# Patient Record
Sex: MALE | Race: WHITE | Employment: FULL TIME | URBAN - METROPOLITAN AREA
[De-identification: names, ages, dates, MRNs, and addresses within clinical notes are randomized per-mention and may not be internally consistent; named-entity substitution may affect disease eponyms.]

---

## 2017-01-06 ENCOUNTER — THERAPY VISIT (OUTPATIENT)
Dept: PHYSICAL THERAPY | Facility: CLINIC | Age: 47
End: 2017-01-06
Payer: COMMERCIAL

## 2017-01-06 DIAGNOSIS — M25.551 HIP PAIN, RIGHT: ICD-10-CM

## 2017-01-06 DIAGNOSIS — M25.552 HIP PAIN, LEFT: Primary | ICD-10-CM

## 2017-01-06 PROCEDURE — 97110 THERAPEUTIC EXERCISES: CPT | Mod: GP | Performed by: PHYSICAL THERAPIST

## 2017-01-06 PROCEDURE — 97112 NEUROMUSCULAR REEDUCATION: CPT | Mod: GP | Performed by: PHYSICAL THERAPIST

## 2017-01-25 ENCOUNTER — THERAPY VISIT (OUTPATIENT)
Dept: PHYSICAL THERAPY | Facility: CLINIC | Age: 47
End: 2017-01-25
Payer: COMMERCIAL

## 2017-01-25 DIAGNOSIS — M25.552 HIP PAIN, LEFT: Primary | ICD-10-CM

## 2017-01-25 DIAGNOSIS — M25.551 HIP PAIN, RIGHT: ICD-10-CM

## 2017-01-25 PROCEDURE — 97140 MANUAL THERAPY 1/> REGIONS: CPT | Mod: GP | Performed by: PHYSICAL THERAPIST

## 2017-01-25 PROCEDURE — 97110 THERAPEUTIC EXERCISES: CPT | Mod: GP | Performed by: PHYSICAL THERAPIST

## 2017-02-23 ENCOUNTER — THERAPY VISIT (OUTPATIENT)
Dept: PHYSICAL THERAPY | Facility: CLINIC | Age: 47
End: 2017-02-23
Payer: COMMERCIAL

## 2017-02-23 DIAGNOSIS — M25.552 HIP PAIN, LEFT: ICD-10-CM

## 2017-02-23 DIAGNOSIS — M25.551 HIP PAIN, RIGHT: ICD-10-CM

## 2017-02-23 PROCEDURE — 97110 THERAPEUTIC EXERCISES: CPT | Mod: GP | Performed by: PHYSICAL THERAPIST

## 2017-02-23 NOTE — LETTER
[facility]  [address]  [place] [address]  [phone]    2017    Re: Cleveland Maciel   :   1970  MRN:  [id number]   REFERRING PHYSICIAN:   [facility]    Date of Initial Evaluation:  16  Visits:  Rxs Used: 10  Reason for Referral:     Hip pain, left  Hip pain, right    PROGRESS  REPORT  Progress reporting period is from 2016 to 2017.     SUBJECTIVE  Subjective: Cleveland has low grade intermittant adductor soreness yet that is quite improved over time and exercise emphasizing hip, glute, core strength, but still lingering and reproducable with DALTON and FADIR end range stress. Roughly 6 1/2 months of  self care over 10 PT visits. Patient also self initiated [facility]   Current Pain level: 1/10   Initial Pain level: 7/10   Changes in function: No changes noted in function since last SOAP note   Adverse reactions: None    OBJECTIVE  Objective: Adductor palpation pain that may respond to change of exercise and deep tissue massage.   Will elect FADIR stretch for the next 10-14 days, if no change , consider deep tissue work.    Patient continues to slowly be on the mend and increased activity tolerance. He may be candidate to follow with MD once again to address possible internal labral derangement vs RINA impingement   Artem to ascend and descend stairs, walk and stand greater than 1/2 hour, and partial squat, all which he was unable to perform 6 months ago.    ASSESSMENT/PLAN  Updated problem list and treatment plan: Diagnosis 1:  Bilateral hip/groin  pain     STG/LTGs have been met or progress has been made towards goals:  Yes (See Goal flow sheet completed today.)  Assessment of Progress: The patient's condition is improving.  The patient's progress has slowed.  Self Management Plans:  Patient has been instructed in a home treatment program.  I have re-evaluated this patient and find that the nature,  scope, duration and intensity of the therapy is appropriate for the medical condition of the patient.  The patient is returning to your office for a recheck appointment.    Recommendations:  This patient would benefit from continued therapy.     Frequency:  1-2 X a month, once daily  Duration:  for 2 months    Thank you for your referral.    INQUIRIES  Therapist: [facility]  [address] [place] [address]  Phone: [phone]  Fax: [phone]

## 2017-02-26 NOTE — PROGRESS NOTES
Subjective:    HPI                    Objective:    System    Physical Exam    General     ROS    Assessment/Plan:      PROGRESS  REPORT    Progress reporting period is from June 29, 2016 to February 23, 2017.     SUBJECTIVE  Subjective: Cleveland has low grade intermittant adductor soreness yet that is quite improved over time and exercise emphasizing hip, glute, core strength, but still lingering and reproducable with DALTON and FADIR end range stress. Roughly 6 1/2 months of  self care over 10 PT visits. Patient also self initiated [facility]   Current Pain level: 1/10   Initial Pain level: 7/10   Changes in function: No changes noted in function since last SOAP note   Adverse reactions: None;   ,         OBJECTIVE  Objective: Adductor palpation pain that may respond to change of exercise and deep tissue massage.     Will elect FADIR stretch for the next 10-14 days, if no change , consider deep tissue work.       Patient continues to slowly be on the mend and increased activity tolerance. He may be candidate to follow with MD once again to address possible internal labral derangement vs RINA impingement     Artem to ascend and descend stairs, walk and stand greater than 1/2 hour, and partial squat, all which he was unable to perform 6 months ago.    ASSESSMENT/PLAN  Updated problem list and treatment plan: Diagnosis 1:  Bilateral hip/groin  pain     STG/LTGs have been met or progress has been made towards goals:  Yes (See Goal flow sheet completed today.)  Assessment of Progress: The patient's condition is improving.  The patient's progress has slowed.  Self Management Plans:  Patient has been instructed in a home treatment program.  I have re-evaluated this patient and find that the nature, scope, duration and intensity of the therapy is appropriate for the medical condition of the patient.    The patient is returning to your office for a recheck appointment.    Recommendations:  This patient would benefit  from continued therapy.     Frequency:  1-2 X a month, once daily  Duration:  for 2 months        Please refer to the daily flowsheet for treatment today, total treatment time and time spent performing 1:1 timed codes.

## 2017-03-22 ENCOUNTER — THERAPY VISIT (OUTPATIENT)
Dept: PHYSICAL THERAPY | Facility: CLINIC | Age: 47
End: 2017-03-22
Payer: COMMERCIAL

## 2017-03-22 DIAGNOSIS — M25.552 HIP PAIN, LEFT: ICD-10-CM

## 2017-03-22 DIAGNOSIS — M25.551 HIP PAIN, RIGHT: ICD-10-CM

## 2017-03-22 PROCEDURE — 97110 THERAPEUTIC EXERCISES: CPT | Mod: GP | Performed by: PHYSICAL THERAPIST

## 2017-03-22 PROCEDURE — 97035 APP MDLTY 1+ULTRASOUND EA 15: CPT | Mod: GP | Performed by: PHYSICAL THERAPIST

## 2017-03-22 PROCEDURE — 97140 MANUAL THERAPY 1/> REGIONS: CPT | Mod: GP | Performed by: PHYSICAL THERAPIST

## 2017-03-22 NOTE — LETTER
[facility]  [address]  [place] [address]  [phone]    2017    Re: Cleveland Maciel   :   1970  MRN:  [id number]   REFERRING PHYSICIAN:   [facility]    Date of Initial Evaluation:  17  Visits:  Rxs Used: 11  Reason for Referral:     Hip pain, left  Hip pain, right    PROGRESS  REPORT  Progress reporting period is from 2017  to 2017 .     SUBJECTIVE  Subjective: Cleveland has been stretching and strengthening  L hip and core for the past 6 months. On balance, he is improved function and sx's, but certainly symptomatic yet with hip adductor and impingement pain with FADIR movement. Cleveland is fully activie with day to day activity, including ice skating and low impact sports.    Current Pain level: 0/10   Initial Pain level: 7/10   Changes in function: No changes noted in function since last SOAP note   Adverse reactions: None    OBJECTIVE  Objective: Elected to manage deep soft tissue massge to L hip adductor longus insertion. Continue core and hip strength. ADvance with IR/ER hip stretching, hip flexor stretch as well. Consider DC PT if no change of sx's and recommend MD consult for internal deragement within the next 2-3 weeks if failing to improve further.       ASSESSMENT/PLAN  Updated problem list and treatment plan: Diagnosis 1:  L hip /groin pain     STG/LTGs have been met or progress has been made towards goals:  Yes (See Goal flow sheet completed today.)  Assessment of Progress: The patient's progress has plateaued.  The patient's condition is unchanged.  Self Management Plans:  Patient has been instructed in a home treatment program.  I have re-evaluated this patient and find that the nature, scope, duration and intensity of the therapy is appropriate for the medical condition of the patient.  The patient is returning to your office for a recheck appointment.  The patient failed to complete scheduled/ordered  appointments so current information is unknown.  We will discharge this patient from PT.    Recommendations:  This patient would benefit from further evaluation.    Thank you for your referral.    INQUIRIES  Therapist: aRsta Alejandre PT  [facility]  [address] [place] [address]  Phone: [phone]  Fax: [phone]

## 2017-03-22 NOTE — PROGRESS NOTES
Subjective:    HPI                    Objective:    System    Physical Exam    General     ROS    Assessment/Plan:      PROGRESS  REPORT    Progress reporting period is from Feb 23, 2017  to March 23, 2017 .     SUBJECTIVE  Subjective: Cleveland has been stretching and strengthening  L hip and core for the past 6 months. On balance, he is improved function and sx's, but certainly symptomatic yet with hip adductor and impingement pain with FADIR movement. Cleveland is fully activie with day to day activity, including ice skating and low impact sports.        Current Pain level: 0/10   Initial Pain level: 7/10   Changes in function: No changes noted in function since last SOAP note   Adverse reactions: None;   ,         OBJECTIVE  Objective: Elected to manage deep soft tissue massge to L hip adductor longus insertion. Continue core and hip strength. ADvance with IR/ER hip stretching, hip flexor stretch as well. Consider DC PT if no change of sx's and recommend MD consult for internal deragement within the next 2-3 weeks if failing to improve further.       ASSESSMENT/PLAN  Updated problem list and treatment plan: Diagnosis 1:  L hip /groin pain     STG/LTGs have been met or progress has been made towards goals:  Yes (See Goal flow sheet completed today.)  Assessment of Progress: The patient's progress has plateaued.  The patient's condition is unchanged.  Self Management Plans:  Patient has been instructed in a home treatment program.  I have re-evaluated this patient and find that the nature, scope, duration and intensity of the therapy is appropriate for the medical condition of the patient.    The patient is returning to your office for a recheck appointment.  The patient failed to complete scheduled/ordered appointments so current information is unknown.  We will discharge this patient from PT.    Recommendations:  This patient would benefit from further evaluation.    Please refer to the daily flowsheet for treatment  today, total treatment time and time spent performing 1:1 timed codes.

## 2017-04-26 ENCOUNTER — THERAPY VISIT (OUTPATIENT)
Dept: PHYSICAL THERAPY | Facility: CLINIC | Age: 47
End: 2017-04-26
Payer: COMMERCIAL

## 2017-04-26 DIAGNOSIS — M25.552 HIP PAIN, LEFT: ICD-10-CM

## 2017-04-26 DIAGNOSIS — M25.551 HIP PAIN, RIGHT: ICD-10-CM

## 2017-04-26 PROCEDURE — 97110 THERAPEUTIC EXERCISES: CPT | Mod: GP | Performed by: PHYSICAL THERAPIST

## 2017-04-26 NOTE — LETTER
[facility]  [address]  [place] [id number] [phone]    May 16, 2017    Re: Cleveland Maciel   :   1970  MRN:  [id number]   REFERRING PHYSICIAN:   [facility]    Date of Initial Evaluation:  16  Visits:  Rxs Used: 12  Reason for Referral:     Hip pain, left  Hip pain, right    EVALUATION SUMMARY     DISCHARGE REPORT  Progress reporting period is from 2017 to 2017.     SUBJECTIVE  Subjective: Cleveland has been seen in PT for a total of 12 visits over the past 10 months. Overall he is improved in many ways of pain, ROM and strength. However, he has plateued in progression over the last 12 weeks. Patient has engaged to progressive strengthening, for core, hips, gliutes, hamstring/quad as well as global stretch modification and manual mobilization. Modalities also exhausted.    Current Pain level: 3/10   Initial Pain level: 7/10   Changes in function: No changes noted in function since last SOAP note   Adverse reactions: None;   ,       OBJECTIVE  Objective: Positive RINA sx's and testing. Recommend to follow with his hip specialist MD. DC PT  is plan at this point until further consult with ortho.       ASSESSMENT/PLAN  Updated problem list and treatment plan: Diagnosis 1:  Bilateral hip and groin pain, chronic      Assessment of Progress: The patient's condition is unchanged.  Self Management Plans:  Patient has been instructed in a home treatment program.    The patient is returning to your office for a recheck appointment.    Recommendations:  This patient is ready to be discharged from therapy and continue their home treatment program.    Thank you for your referral.    INQUIRIES  Therapist: Rasta Alejandre PT, ATC   [facility]  [address]  [place] [address]  Phone: [phone]  Fax: [phone]

## 2017-05-16 NOTE — PROGRESS NOTES
Subjective:    HPI                    Objective:    System    Physical Exam    General     ROS    Assessment/Plan:      DISCHARGE REPORT    Progress reporting period is from March 23, 2017 to April 26, 2017.     SUBJECTIVE  Subjective: Cleveland has been seen in PT for a total of 12 visits over the past 10 months. Overall he is improved in many ways of pain, ROM and strength. However, he has plateued in progression over the last 12 weeks. Patient has engaged to progressive strengthening, for core, hips, gliutes, hamstring/quad as well as global stretch modification and manual mobilization. Modalities also exhausted.    Current Pain level: 3/10   Initial Pain level: 7/10   Changes in function: No changes noted in function since last SOAP note   Adverse reactions: None;   ,         OBJECTIVE  Objective: Positive RINA sx's and testing. Recommend to follow with his hip specialist MD. DC PT  is plan at this point until further consult with ortho.       ASSESSMENT/PLAN  Updated problem list and treatment plan: Diagnosis 1:  Bilateral hip and groin pain, chronic      Assessment of Progress: The patient's condition is unchanged.  Self Management Plans:  Patient has been instructed in a home treatment program.      The patient is returning to your office for a recheck appointment.    Recommendations:  This patient is ready to be discharged from therapy and continue their home treatment program.    Please refer to the daily flowsheet for treatment today, total treatment time and time spent performing 1:1 timed codes.

## 2019-03-01 ENCOUNTER — OFFICE VISIT (OUTPATIENT)
Dept: URGENT CARE | Facility: URGENT CARE | Age: 49
End: 2019-03-01
Payer: COMMERCIAL

## 2019-03-01 ENCOUNTER — ANCILLARY PROCEDURE (OUTPATIENT)
Dept: GENERAL RADIOLOGY | Facility: CLINIC | Age: 49
End: 2019-03-01
Attending: NURSE PRACTITIONER
Payer: COMMERCIAL

## 2019-03-01 VITALS
HEART RATE: 58 BPM | TEMPERATURE: 98.5 F | OXYGEN SATURATION: 98 % | SYSTOLIC BLOOD PRESSURE: 177 MMHG | WEIGHT: 192 LBS | DIASTOLIC BLOOD PRESSURE: 90 MMHG

## 2019-03-01 DIAGNOSIS — R07.81 RIB TENDERNESS: Primary | ICD-10-CM

## 2019-03-01 DIAGNOSIS — R07.81 RIB TENDERNESS: ICD-10-CM

## 2019-03-01 PROCEDURE — 96372 THER/PROPH/DIAG INJ SC/IM: CPT | Performed by: NURSE PRACTITIONER

## 2019-03-01 PROCEDURE — 99213 OFFICE O/P EST LOW 20 MIN: CPT | Performed by: NURSE PRACTITIONER

## 2019-03-01 PROCEDURE — 71101 X-RAY EXAM UNILAT RIBS/CHEST: CPT | Mod: RT

## 2019-03-01 RX ORDER — HYDROCODONE BITARTRATE AND ACETAMINOPHEN 5; 325 MG/1; MG/1
1 TABLET ORAL EVERY 6 HOURS PRN
Qty: 10 TABLET | Refills: 0 | Status: SHIPPED | OUTPATIENT
Start: 2019-03-01 | End: 2019-03-04

## 2019-03-01 RX ORDER — DICLOFENAC SODIUM 75 MG/1
TABLET, DELAYED RELEASE ORAL
Refills: 1 | COMMUNITY
Start: 2019-01-07 | End: 2021-08-15

## 2019-03-01 RX ORDER — KETOROLAC TROMETHAMINE 30 MG/ML
60 INJECTION, SOLUTION INTRAMUSCULAR; INTRAVENOUS ONCE
Status: COMPLETED | OUTPATIENT
Start: 2019-03-01 | End: 2019-03-01

## 2019-03-01 RX ORDER — DEXTROAMPHETAMINE SACCHARATE, AMPHETAMINE ASPARTATE, DEXTROAMPHETAMINE SULFATE AND AMPHETAMINE SULFATE 7.5; 7.5; 7.5; 7.5 MG/1; MG/1; MG/1; MG/1
30 TABLET ORAL EVERY 24 HOURS
COMMUNITY
Start: 2014-12-28 | End: 2021-08-15

## 2019-03-01 RX ADMIN — KETOROLAC TROMETHAMINE 60 MG: 30 INJECTION, SOLUTION INTRAMUSCULAR; INTRAVENOUS at 21:12

## 2019-03-01 ASSESSMENT — ENCOUNTER SYMPTOMS
CONSTITUTIONAL NEGATIVE: 1
NEUROLOGICAL NEGATIVE: 1
COUGH: 0
HEMOPTYSIS: 0
SHORTNESS OF BREATH: 0

## 2019-03-01 ASSESSMENT — PAIN SCALES - GENERAL: PAINLEVEL: EXTREME PAIN (8)

## 2019-03-02 NOTE — PATIENT INSTRUCTIONS
Patient Education     Rib Contusion or Minor Fracture    A rib contusion is a bruise to one or more rib bones. It may cause pain, tenderness, swelling, and a purplish color to the skin. There may be a sharp pain with each breath. A rib contusion takes anywhere from a few days to a few weeks to heal. A minor rib fracture or break may cause the same symptoms as a rib contusion. The small crack may not be seen on a regular chest X-ray. Treatment for both problems is basically the same.  Home care    You may use over-the-counter pain medicine to control pain, unless another pain medicine was prescribed. If you have chronic liver or kidney disease or ever had a stomach ulcer or GI (gastrointestinal) bleeding, talk with your healthcare provider before using these medicines.    Rest. Don't lift anything heavy or do any activity that causes pain.    Apply an ice pack over the injured area for 15 to 20 minutes every 1 to 2 hours. You should do this for the first 24 to 48 hours. To make an ice pack, put ice cubes in a plastic bag that seals at the top. Wrap the bag in a clean, thin towel or cloth. Never put ice or an ice pack directly on the skin. Continue with ice packs as needed for the relief of pain and swelling.    The first 3 to 4 weeks of healing will be the most painful. If your pain is not under control with the treatment given, call your healthcare provider. Sometimes a stronger pain medicine may be needed. A nerve block can be done in case of severe pain. It will numb the nerve between the ribs.  Follow-up care  Follow up with your healthcare provider, or as advised.  If X-rays were taken, you will be told of any new findings that may affect your care.  Call 911  Call 911 if you have:    Dizziness, weakness or fainting    Shortness of breath with or without chest discomfort    New or worsening pain  When to seek medical advice  Call your healthcare provider right away if any of these occur:    Fever of 100.4 F  (38 C) or higher, or as directed by your healthcare provider    Chills    Stomach pain, vomiting  Date Last Reviewed: 6/1/2018 2000-2018 [email or web address]. [address] [place] [address], [place] [address]. All rights reserved. This information is not intended as a substitute for professional medical care. Always follow your healthcare professional's instructions.

## 2019-03-02 NOTE — PROGRESS NOTES
"HPI  Cleveland Maciel 48 year old presents to  with chief complaint of severe anterior rib pain. He reports he was getting a chiropractic adjustment today when he was \"snapped\" and had immediate severe pain to the right side of his anterior chest. The pain has worsened over the day and now it is painful to move, breath or lie down. He took 2 ibuprofen approximately 5 hours ago. He also tried icing without much relief. He rates his pain an 8-9/10.       Past Medical History:   Diagnosis Date     ADD (attention deficit disorder)      No past surgical history on file.  Patient Active Problem List   Diagnosis   (none) - all problems resolved or deleted     No Known Allergies  Current Outpatient Medications   Medication     amphetamine-dextroamphetamine (ADDERALL) 30 MG tablet     HYDROcodone-acetaminophen (NORCO) 5-325 MG tablet     diclofenac (VOLTAREN) 75 MG EC tablet     No current facility-administered medications for this visit.           Review of Systems   Constitutional: Negative.    Respiratory: Negative for cough, hemoptysis and shortness of breath.    Cardiovascular: Positive for chest pain.   Musculoskeletal:        Rib pain right anterior   Skin: Negative.    Neurological: Negative.    Endo/Heme/Allergies: Negative.          Physical Exam   Constitutional: He appears well-developed and well-nourished. No distress.   /90   Pulse 58   Temp 98.5  F (36.9  C) (Oral)   Wt 87.1 kg (192 lb)   SpO2 98% .    HENT:   Head: Normocephalic.   Cardiovascular: Normal rate.   Pulmonary/Chest: Effort normal and breath sounds normal.   Musculoskeletal:        Arms:  Skin: Skin is warm and dry. No ecchymosis noted.   Nursing note and vitals reviewed.    XRAY: Full radiologist report pending, no obvious dislocated fracture,     Assessment:  1. Rib tenderness        Plan:  Orders Placed This Encounter     XR Ribs & Chest Right G/E 3 Views     amphetamine-dextroamphetamine (ADDERALL) 30 MG tablet     diclofenac " (VOLTAREN) 75 MG EC tablet     ketorolac (TORADOL) injection 60 mg     HYDROcodone-acetaminophen (NORCO) 5-325 MG tablet   Icing  Splint with cough/sneeze  Deep breath every hour to prevent atelectasis  Instructions regarding self-care of patient/child reviewed.   Written instructions provided in after visit summary and reviewed.  Patient instructed to see primary care provider for new or persistent symptoms.   Red flag symptoms reviewed and patient has been instructed to seek emergent   Care at the closest emergency room for evaluation and treatment.   Please contact pharmacy for medication questions.  Patient instructed to take medications as directed on package.      Kaia Jarquin, APRN, CNP

## 2021-08-15 ENCOUNTER — OFFICE VISIT (OUTPATIENT)
Dept: URGENT CARE | Facility: URGENT CARE | Age: 51
End: 2021-08-15
Payer: COMMERCIAL

## 2021-08-15 VITALS
SYSTOLIC BLOOD PRESSURE: 151 MMHG | OXYGEN SATURATION: 99 % | HEART RATE: 71 BPM | TEMPERATURE: 98 F | DIASTOLIC BLOOD PRESSURE: 81 MMHG

## 2021-08-15 DIAGNOSIS — J01.90 SUBACUTE SINUSITIS, UNSPECIFIED LOCATION: Primary | ICD-10-CM

## 2021-08-15 PROCEDURE — 99213 OFFICE O/P EST LOW 20 MIN: CPT | Performed by: FAMILY MEDICINE

## 2021-08-15 NOTE — PROGRESS NOTES
Assessment & Plan     Subacute sinusitis, unspecified location  Differentials discussed in detail including subacute sinusitis versus allergies.  Augmentin prescribed considering chronicity of symptoms and suggested to use Flonase, over-the-counter antihistamine.  Continue well hydration, warm fluids.  Recommended to monitor blood pressure at home regularly and to follow-up with PCP as well.  Patient/spouse understood and in agreement with above plan.  All questions answered.  - amoxicillin-clavulanate (AUGMENTIN) 875-125 MG tablet; Take 1 tablet by mouth 2 times daily for 10 days      Marcus Aguirre MD  [facility]    Caron Guthrie is a 51 year old who presents for the following health issues     HPI     Concern -   Chief Complaint   Patient presents with     Sinus Problem     sinus congestion 5+ weeks     Onset: 5 weeks   Description: nasal congestion, post nasal drainage and some cough  Intensity: moderate  Progression of Symptoms:  same  Accompanying Signs & Symptoms: no fever, chills, chest pain, sob  Previous history of similar problem: no    Therapies tried and outcome: tylenol sinus        Review of Systems   Constitutional, HEENT, cardiovascular, pulmonary, gi and gu systems are negative, except as otherwise noted.      Objective    BP (!) 151/81   Pulse 71   Temp 98  F (36.7  C) (Tympanic)   SpO2 99%   There is no height or weight on file to calculate BMI.  Physical Exam   GENERAL: alert and no distress  EYES: Eyes grossly normal to inspection, PERRL and conjunctivae and sclerae normal  HENT: normal cephalic/atraumatic, ear canals and TM's normal, nose and mouth without ulcers or lesions, nasal mucosa edematous , oral mucous membranes moist and sinuses: not tender  NECK: no adenopathy, no asymmetry, masses, or scars and thyroid normal to palpation  RESP: lungs clear to auscultation - no rales, rhonchi or wheezes  CV: regular rates and rhythm, normal S1 S2, no S3 or S4  and no murmur, click or rub  ABDOMEN: soft, nontender  MS: no gross musculoskeletal defects noted, no edema

## 2021-08-29 ENCOUNTER — OFFICE VISIT (OUTPATIENT)
Dept: URGENT CARE | Facility: URGENT CARE | Age: 51
End: 2021-08-29
Payer: COMMERCIAL

## 2021-08-29 VITALS
TEMPERATURE: 98.7 F | DIASTOLIC BLOOD PRESSURE: 88 MMHG | SYSTOLIC BLOOD PRESSURE: 144 MMHG | HEART RATE: 79 BPM | OXYGEN SATURATION: 98 % | WEIGHT: 200 LBS

## 2021-08-29 DIAGNOSIS — J32.1 CHRONIC FRONTAL SINUSITIS: ICD-10-CM

## 2021-08-29 DIAGNOSIS — R03.0 ELEVATED BLOOD PRESSURE READING WITHOUT DIAGNOSIS OF HYPERTENSION: ICD-10-CM

## 2021-08-29 DIAGNOSIS — R51.9 ACUTE NONINTRACTABLE HEADACHE, UNSPECIFIED HEADACHE TYPE: Primary | ICD-10-CM

## 2021-08-29 PROCEDURE — 86618 LYME DISEASE ANTIBODY: CPT | Performed by: NURSE PRACTITIONER

## 2021-08-29 PROCEDURE — 99214 OFFICE O/P EST MOD 30 MIN: CPT | Performed by: NURSE PRACTITIONER

## 2021-08-29 PROCEDURE — 36415 COLL VENOUS BLD VENIPUNCTURE: CPT | Performed by: NURSE PRACTITIONER

## 2021-08-29 RX ORDER — DOXYCYCLINE HYCLATE 100 MG
100 TABLET ORAL 2 TIMES DAILY
Qty: 20 TABLET | Refills: 0 | Status: SHIPPED | OUTPATIENT
Start: 2021-08-29 | End: 2021-09-08

## 2021-08-29 RX ORDER — FLUTICASONE PROPIONATE 50 MCG
1 SPRAY, SUSPENSION (ML) NASAL DAILY
COMMUNITY
End: 2021-11-15

## 2021-08-29 RX ORDER — LORATADINE 10 MG/1
10 TABLET ORAL DAILY
COMMUNITY
End: 2022-02-15

## 2021-08-29 NOTE — LETTER
August 31, 2021      [place]  [address] [facility] [address]        Dear ,    We are writing to inform you of your test results.    Lyme's negative   SEBASTIÁN Munoz CNP       Resulted Orders   Lyme Disease Amparo with reflex to WB Serum   Result Value Ref Range    Lyme Disease Antibodies Total 0.15 <0.90      Comment:      Negative, Absence of detectable Borrelia burdorferi antibodies. A negative result does not exclude the possibility of Borrelia burgdorferi infection. If early Lyme disease is suspected, a second sample should be collected and tested 2 to 4 weeks later.

## 2021-08-29 NOTE — PROGRESS NOTES
Assessment & Plan     Acute nonintractable headache, unspecified headache type    - Lyme Disease Amparo with reflex to WB Serum  - doxycycline hyclate (VIBRA-TABS) 100 MG tablet  Dispense: 20 tablet; Refill: 0  - Lyme Disease Amparo with reflex to WB Serum    Chronic frontal sinusitis    - Otolaryngology Referral    Elevated blood pressure reading without diagnosis of hypertension       Patient declines COVID testing and chest xray at this time. Prescription sent to pharmacy for doxycycline twice daily for 10 days for possibility of chronic sinusitis causing symptoms. Patient requests ENT referral which is provided. Lyme testing in process, will notify if positive and extend doxycyline to 100 mg twice daily for 14 days. Rest, fluids, tylenol as needed. Continue flonase and Claritin. Add neti pot twice daily and decrease dairy which can worsen nasal congestion.     Patient is hypertensive today but asymptomatic.  No blurring of vision, chest pain, shortness of breath, dizziness, numbness, or weakness. Second BP reading was also above goal.  Patient instructed to follow up with PCP within the next week for BP recheck.  Instructed to go to emergency department if develops chest pain, shortness of breath, dizziness, vision changes, numbness, weakness.     Follow-up with PCP if symptoms persist for 7 days, and sooner if symptoms worsen or new symptoms develop.     Discussed red flag symptoms which warrant immediate visit in emergency room    All questions were answered and patient verbalized understanding. AVS reviewed with patient.     Rosa Oconnor, DNP, APRN, CNP 8/29/2021 4:48 PM  [facility] [place]            Caron Guthrie is a 51 year old male who presents to clinic today with significant other for the following health issues:  Chief Complaint   Patient presents with     Headache     Sx started around July 10th. Has been seen for this a few weeks ago and did Abx, claritin and flonase. Some  help but Sx are back. pressure over right eye.     Patient presents for evaluation of sinus pressure/headache. Symptoms started about 7 weeks ago, 7/10. He was evaluated in urgent care 8/15 when he was treated with Augmentin for sinusitis which helped temporarily along with flonase and Claritin, but now symptoms have returned. Associated symptoms: fatigue, nasal congestion, post-nasal drip, cough. Nasal congestion still present, but has improved and is now worsening. He has been having a dry cough which is worse in the morning and gets better as symptoms improve. He took tylenol today and isn't sure if that has helped. He has a history of sinus infection for which he had a CT and it was behind his right eye. He has not been vaccinated for COVID and declines testing. Denies vision changes, joint pain, though he has some neck discomfort. Denies ear pain, teeth pain, sore throat, bullseye rash. Headache pain is moderate. Cough has been intermittent and not consistent. He does chew tobacco.  No known tick bite.     Problem list, Medication list, Allergies, and Medical history reviewed in EPIC.    ROS:  Review of systems negative except for noted above        Objective    BP (!) 144/88   Pulse 79   Temp 98.7  F (37.1  C) (Tympanic)   Wt 90.7 kg (200 lb)   SpO2 98%   Physical Exam  Constitutional:       General: He is not in acute distress.     Appearance: He is not toxic-appearing or diaphoretic.   HENT:      Head: Normocephalic and atraumatic.      Right Ear: Tympanic membrane, ear canal and external ear normal.      Left Ear: Tympanic membrane, ear canal and external ear normal.      Nose:      Right Sinus: Frontal sinus tenderness present. No maxillary sinus tenderness.      Left Sinus: No maxillary sinus tenderness or frontal sinus tenderness.      Comments: Moderate nasal congestion     Mouth/Throat:      Mouth: Mucous membranes are moist.      Pharynx: Oropharynx is clear. No oropharyngeal exudate or posterior  oropharyngeal erythema.   Eyes:      General:         Right eye: No discharge.         Left eye: No discharge.      Extraocular Movements: Extraocular movements intact.      Pupils: Pupils are equal, round, and reactive to light.   Cardiovascular:      Rate and Rhythm: Normal rate and regular rhythm.      Heart sounds: Normal heart sounds.   Pulmonary:      Effort: Pulmonary effort is normal. No respiratory distress.      Breath sounds: Normal breath sounds. No wheezing, rhonchi or rales.   Musculoskeletal:      Cervical back: Normal range of motion. No rigidity or tenderness.   Lymphadenopathy:      Cervical: No cervical adenopathy.   Skin:     General: Skin is warm and dry.      Findings: No rash.   Neurological:      General: No focal deficit present.      Mental Status: He is alert and oriented to person, place, and time.      Cranial Nerves: No cranial nerve deficit.      Motor: No weakness.      Gait: Gait normal.

## 2021-08-29 NOTE — PATIENT INSTRUCTIONS
Patient Education     Sinusitis (Antibiotic Treatment)    The sinuses are air-filled spaces within the bones of the face. They connect to the inside of the nose. Sinusitis is an inflammation of the tissue that lines the sinuses. Sinusitis can occur during a cold. It can also happen due to allergies to pollens and other particles in the air. Sinusitis can cause symptoms of sinus congestion and a feeling of fullness. A sinus infection causes fever, headache, and facial pain. There is often green or yellow fluid draining from the nose or into the back of the throat (post-nasal drip). You have been given antibiotics to treat this condition.   Home care    Take the full course of antibiotics as instructed. Don't stop taking them, even when you feel better.    Drink plenty of water, hot tea, and other liquids as directed by the healthcare provider. This may help thin nasal mucus. It also may help your sinuses drain fluids.    Heat may help soothe painful areas of your face. Use a towel soaked in hot water. Or,  the shower and direct the warm spray onto your face. Using a vaporizer along with a menthol rub at night may also help soothe symptoms.     An expectorant with guaifenesin may help thin nasal mucus and help your sinuses drain fluids. Talk with your provider or pharmacists before taking an over-the-counter (OTC) medicine if you have any questions about it or its side effects..    You can use an OTC decongestant, unless a similar medicine was prescribed to you. Nasal sprays work the fastest. Use one that contains phenylephrine or oxymetazoline. First blow your nose gently. Then use the spray. Don't use these medicines more often than directed on the label. If you do, your symptoms may get worse. You may also take pills that contain pseudoephedrine. Don t use products that combine multiple medicines. This is because side effects may be increased. Read labels. You can also ask the pharmacist for help. (People  with high blood pressure should not use decongestants. They can raise blood pressure.) Talk with your provider or pharmacist if you have any questions about the medicine..    OTC antihistamines may help if allergies contributed to your sinusitis. Talk with your provider or pharmacist if you have any questions about the medicine..    Don't use nasal rinses or irrigation during an acute sinus infection, unless your healthcare provider tells you to. Rinsing may spread the infection to other areas in your sinuses.    Use acetaminophen or ibuprofen to control pain, unless another pain medicine was prescribed to you. If you have chronic liver or kidney disease or ever had a stomach ulcer, talk with your healthcare provider before using these medicines. Never give aspirin to anyone under age 18 who is ill with a fever. It may cause severe liver damage.    Don't smoke. This can make symptoms worse.    Follow-up care  Follow up with your healthcare provider, or as advised.   When to seek medical advice  Call your healthcare provider if any of these occur:     Facial pain or headache that gets worse    Stiff neck    Unusual drowsiness or confusion    Swelling of your forehead or eyelids    Symptoms don't go away in 10 days    Vision problems, such as blurred or double vision    Fever of 100.4 F (38 C) or higher, or as directed by your healthcare provider  Call 911  Call 911 if any of these occur:     Seizure    Trouble breathing    Feeling dizzy or faint    Fingernails, skin or lips look blue, purple , or gray  Prevention  Here are steps you can take to help prevent an infection:     Keep good hand washing habits.    Don t have close contact with people who have sore throats, colds, or other upper respiratory infections.    Don t smoke, and stay away from secondhand smoke.    Stay up to date with of your vaccines.  [employer] last reviewed this educational content on 12/1/2019 2000-2021 [employer]. All rights  reserved. This information is not intended as a substitute for professional medical care. Always follow your healthcare professional's instructions.

## 2021-08-30 LAB — B BURGDOR IGG+IGM SER QL: 0.15

## 2021-09-09 ENCOUNTER — OFFICE VISIT (OUTPATIENT)
Dept: OTOLARYNGOLOGY | Facility: CLINIC | Age: 51
End: 2021-09-09
Payer: COMMERCIAL

## 2021-09-09 VITALS
DIASTOLIC BLOOD PRESSURE: 83 MMHG | RESPIRATION RATE: 16 BRPM | HEART RATE: 86 BPM | SYSTOLIC BLOOD PRESSURE: 144 MMHG | OXYGEN SATURATION: 98 %

## 2021-09-09 DIAGNOSIS — J32.4 CHRONIC PANSINUSITIS: Primary | ICD-10-CM

## 2021-09-09 DIAGNOSIS — J33.9 NASAL POLYP: ICD-10-CM

## 2021-09-09 DIAGNOSIS — R43.0 ANOSMIA: ICD-10-CM

## 2021-09-09 PROCEDURE — 99203 OFFICE O/P NEW LOW 30 MIN: CPT | Mod: 25 | Performed by: OTOLARYNGOLOGY

## 2021-09-09 PROCEDURE — 31231 NASAL ENDOSCOPY DX: CPT | Performed by: OTOLARYNGOLOGY

## 2021-09-09 RX ORDER — PREDNISONE 20 MG/1
TABLET ORAL
Qty: 41 TABLET | Refills: 0 | Status: SHIPPED | OUTPATIENT
Start: 2021-09-09 | End: 2021-11-08

## 2021-09-09 NOTE — LETTER
9/9/2021         RE: Cleveland Maciel  [address] [facility] [address] [address]        Dear Colleague,    Thank you for referring your patient, Cleveland Maciel, to the [facility]. Please see a copy of my visit note below.    I am seeing this patient in consultation for chronic frontal sinusitis at the request of the provider Rosa Oconnor.    Chief Complaint - headache, sinus pressure    History of Present Illness - Cleveland Maciel is a 51 year old male who presents for evaluation of headache. The patient describes symptoms of pain above right eye, feeling unwell, head pressure/fullness for the past couple months. He had something similar 20 years ago. CT showed sinusitis, and this improved with antibiotics. This time he was given antibiotics, it helped some, but then symptoms worsened. He tried doxycycline and he has felt a little better the last couple days. He gets fatigued, no energy. No vision symptoms. no nasal symptoms now, but when it started he had nasal congestion. He also tried flonase. Nasal congestion has gone away. His breathing is okay. He still feels he sounds a little congestion. He hasn't been able to smell for 15 years. No prior history of sinus surgery. I personally reviewed the relevant clinical notes in Epic including the primary care providers note. Lyme was negative.     Past Medical History - healthy    Current Medications -   Current Outpatient Medications:      doxycycline hyclate (VIBRA-TABS) 100 MG tablet, Take 1 tablet (100 mg) by mouth 2 times daily for 10 days, Disp: 20 tablet, Rfl: 0     fluticasone (FLONASE) 50 MCG/ACT nasal spray, Spray 1 spray into both nostrils daily, Disp: , Rfl:      loratadine (CLARITIN) 10 MG tablet, Take 10 mg by mouth daily, Disp: , Rfl:     Allergies - No Known Allergies    Social History -   Social History     Socioeconomic History     Marital status:      Spouse name: Not on file     Number of children: Not on file      Years of education: Not on file     Highest education level: Not on file   Occupational History     Not on file   Tobacco Use     Smoking status: Never Smoker     Smokeless tobacco: Current User     Types: Chew   Substance and Sexual Activity     Alcohol use: Not on file     Drug use: Not on file     Sexual activity: Not on file   Other Topics Concern     Not on file   Social History Narrative     Not on file     Social Determinants of Health     Financial Resource Strain:      Difficulty of Paying Living Expenses:    Food Insecurity:      Worried About Running Out of Food in the Last Year:      Ran Out of Food in the Last Year:    Transportation Needs:      Lack of Transportation (Medical):      Lack of Transportation (Non-Medical):    Physical Activity:      Days of Exercise per Week:      Minutes of Exercise per Session:    Stress:      Feeling of Stress :    Social Connections:      Frequency of Communication with Friends and Family:      Frequency of Social Gatherings with Friends and Family:      Attends Ten[employer] Services:      Active Member of Clubs or Organizations:      Attends Club or Organization Meetings:      Marital Status:    Intimate Partner Violence:      Fear of Current or Ex-Partner:      Emotionally Abused:      Physically Abused:      Sexually Abused:        Family History - no polyps noted in family    Review of Systems - As per HPI and PMHx, otherwise 10+ comprehensive system review is negative.    Physical Exam  BP (!) 144/83   Pulse 86   Resp 16   SpO2 98%   General - The patient is nontoxic, in no distress. Alert and oriented to person and place, answers questions and cooperates with examination appropriately.   Neurologic - CN II-XII are intact. No focal neurologic deficits.   Voice and Breathing - The patient was breathing comfortably without the use of accessory muscles. There was no wheezing, stridor, or stertor.  The patients voice was clear and strong.  Eyes - Extraocular  movements intact. Sclera were not icteric or injected, conjunctiva were pink and moist.  Mouth - Examination of the oral cavity showed pink, healthy oral mucosa. No lesions or ulcerations noted.  The tongue was mobile and midline.  Throat - The walls of the oropharynx were smooth, symmetric, and had no lesions or ulcerations.  No postnasal drainage.  The uvula was midline on elevation.  Nose - External contour is symmetric, no gross deflection or scars.  Nasal mucosa is pink and moist with no abnormal mucus.  The septum was to the right some, turbinates of normal size and position.  No polyps, masses, or purulence noted on examination.  Neck - Soft, non-tender. Palpation of the occipital, submental, submandibular, internal jugular chain, and supraclavicular nodes did not demonstrate any abnormal lymph nodes or masses. No parotid masses. Palpation of the thyroid was soft and smooth, with no nodules or goiter appreciated.  The trachea was mobile and midline.  Cardiovascular - carotid pulses are 2+ bilaterally, regular rhythm    NASAL ENDOSCOPY -I sprayed both nasal cavities with phenylephrine lidocaine spray.  I used a 2.7 mm rigid nasal endoscope.  I first entered the left side.  The patient had significant nasal polyposis coming down both from the olfactory groove and middle meatus going anteriorly into the nasal cavity.  I took photographs.  There is a little bit of crust on one polyp.  I slid this on the floor the nose he had more polyps in the sphenoethmoid recess but not quite as large.  No pus.  Nasal fracture is normal.  I pulled the scope out looked at the right nasal cavity.  The right septum deviates some with a spur.  He also had some polyps in the right middle meatus and sphenoethmoid recess but they are smaller when compared to the left side.  No pus.  Scope was removed.    A/P - Cleveland Maciel is a 51 year old male with fullness and pressure in sinuses and right forehead pressure.  He has felt a little  bit better after 2 courses of antibiotics.  He has chronic sinusitis and nasal polyposis.  This is likely blocking the sinuses causing infection.  I recommend a prednisone burst and taper, Flonase, nasal saline irrigations.  Because the purulence seems to have resolved I do not think he needs another round of antibiotics.  However I did explain his possible mood out of try that.  We also touched on endoscopic sinus surgery to remove polyps, but I would only recommend that if he fails maximal medical treatment.  Recheck in 3 months or as needed.      Lopez Turner MD  Otolaryngology  [facility]        Again, thank you for allowing me to participate in the care of your patient.        Sincerely,        Lopez Turner MD

## 2021-09-09 NOTE — PROGRESS NOTES
Assessment & Plan       ICD-10-CM    1. Fatigue, unspecified type  R53.83 CBC with platelets and differential     Comprehensive metabolic panel (BMP + Alb, Alk Phos, ALT, AST, Total. Bili, TP)     TSH with free T4 reflex     Vitamin D Deficiency     CBC with platelets and differential     Comprehensive metabolic panel (BMP + Alb, Alk Phos, ALT, AST, Total. Bili, TP)     TSH with free T4 reflex     Vitamin D Deficiency   2. Sinusitis, unspecified chronicity, unspecified location  J32.9    3. Nasal polyp  J33.9    4. Screening cholesterol level  Z13.220 Lipid panel reflex to direct LDL Fasting     Lipid panel reflex to direct LDL Fasting   1. Talk to patient about his concerns.  Labs are pending at his request.  He does have a physical scheduled for next week with Dr. Pride.  2-3: Recommend starting the prednisone and needed Flonase nasal spray as per Dr. Turner's recommendation.  If he continues to have problems he could keep follow-up with Dr. Turner.  No follow-ups on file.    Omi Ibarra PA-C  [facility]    Caron Guthrie is a 51 year old who presents for the following health issues  accompanied by his spouse:    HPI     Per pt he has not been feeling well the last couple of months due to sinus issues - has seen UC x2 and been given abx twice. Did see ENT yesterday and was given prednisone  - will start taking today     He states overall for the last month or 2 he is just been generalized fatigued and malaised.  He does want to make sure there is nothing else that could be causing his symptoms.  He denies any other cold cough fevers chest pain or shortness of breath.  He does exercise regularly without any exertional symptoms.  He just has lack of energy.    Review of Systems   Constitutional, HEENT, cardiovascular, pulmonary, gi and gu systems are negative, except as otherwise noted.      Objective    /82   Pulse 66   Temp 97.5  F (36.4  C) (Tympanic)   Resp 18   Wt 88.9  kg (196 lb)   SpO2 99%   There is no height or weight on file to calculate BMI.  Physical Exam   GENERAL: healthy, alert and no distress  EYES: Eyes grossly normal to inspection, PERRL and conjunctivae and sclerae normal  HENT: ear canals and TM's normal, nose and mouth without ulcers or lesions-nasal polyps noted in each nostril.    NECK: no adenopathy, no asymmetry, masses, or scars and thyroid normal to palpation  RESP: lungs clear to auscultation - no rales, rhonchi or wheezes  CV: regular rate and rhythm, normal S1 S2, no S3 or S4, no murmur, click or rub, no peripheral edema and peripheral pulses strong  ABDOMEN: soft, nontender, no hepatosplenomegaly, no masses and bowel sounds normal  MS: no gross musculoskeletal defects noted, no edema  SKIN: no suspicious lesions or rashes  NEURO: Normal strength and tone, mentation intact and speech normal  PSYCH: mentation appears normal, affect normal/bright    Labs pending

## 2021-09-09 NOTE — PROGRESS NOTES
I am seeing this patient in consultation for chronic frontal sinusitis at the request of the provider Rosa Oconnor.    Chief Complaint - headache, sinus pressure    History of Present Illness - Cleveland Maciel is a 51 year old male who presents for evaluation of headache. The patient describes symptoms of pain above right eye, feeling unwell, head pressure/fullness for the past couple months. He had something similar 20 years ago. CT showed sinusitis, and this improved with antibiotics. This time he was given antibiotics, it helped some, but then symptoms worsened. He tried doxycycline and he has felt a little better the last couple days. He gets fatigued, no energy. No vision symptoms. no nasal symptoms now, but when it started he had nasal congestion. He also tried flonase. Nasal congestion has gone away. His breathing is okay. He still feels he sounds a little congestion. He hasn't been able to smell for 15 years. No prior history of sinus surgery. I personally reviewed the relevant clinical notes in Epic including the primary care providers note. Lyme was negative.     Past Medical History - healthy    Current Medications -   Current Outpatient Medications:      doxycycline hyclate (VIBRA-TABS) 100 MG tablet, Take 1 tablet (100 mg) by mouth 2 times daily for 10 days, Disp: 20 tablet, Rfl: 0     fluticasone (FLONASE) 50 MCG/ACT nasal spray, Spray 1 spray into both nostrils daily, Disp: , Rfl:      loratadine (CLARITIN) 10 MG tablet, Take 10 mg by mouth daily, Disp: , Rfl:     Allergies - No Known Allergies    Social History -   Social History     Socioeconomic History     Marital status:      Spouse name: Not on file     Number of children: Not on file     Years of education: Not on file     Highest education level: Not on file   Occupational History     Not on file   Tobacco Use     Smoking status: Never Smoker     Smokeless tobacco: Current User     Types: Chew   Substance and Sexual Activity      Alcohol use: Not on file     Drug use: Not on file     Sexual activity: Not on file   Other Topics Concern     Not on file   Social History Narrative     Not on file     Social Determinants of Health     Financial Resource Strain:      Difficulty of Paying Living Expenses:    Food Insecurity:      Worried About Running Out of Food in the Last Year:      Ran Out of Food in the Last Year:    Transportation Needs:      Lack of Transportation (Medical):      Lack of Transportation (Non-Medical):    Physical Activity:      Days of Exercise per Week:      Minutes of Exercise per Session:    Stress:      Feeling of Stress :    Social Connections:      Frequency of Communication with Friends and Family:      Frequency of Social Gatherings with Friends and Family:      Attends Pent[employer]al Services:      Active Member of Clubs or Organizations:      Attends Club or Organization Meetings:      Marital Status:    Intimate Partner Violence:      Fear of Current or Ex-Partner:      Emotionally Abused:      Physically Abused:      Sexually Abused:        Family History - no polyps noted in family    Review of Systems - As per HPI and PMHx, otherwise 10+ comprehensive system review is negative.    Physical Exam  BP (!) 144/83   Pulse 86   Resp 16   SpO2 98%   General - The patient is nontoxic, in no distress. Alert and oriented to person and place, answers questions and cooperates with examination appropriately.   Neurologic - CN II-XII are intact. No focal neurologic deficits.   Voice and Breathing - The patient was breathing comfortably without the use of accessory muscles. There was no wheezing, stridor, or stertor.  The patients voice was clear and strong.  Eyes - Extraocular movements intact. Sclera were not icteric or injected, conjunctiva were pink and moist.  Mouth - Examination of the oral cavity showed pink, healthy oral mucosa. No lesions or ulcerations noted.  The tongue was mobile and midline.  Throat - The walls of  the oropharynx were smooth, symmetric, and had no lesions or ulcerations.  No postnasal drainage.  The uvula was midline on elevation.  Nose - External contour is symmetric, no gross deflection or scars.  Nasal mucosa is pink and moist with no abnormal mucus.  The septum was to the right some, turbinates of normal size and position.  No polyps, masses, or purulence noted on examination.  Neck - Soft, non-tender. Palpation of the occipital, submental, submandibular, internal jugular chain, and supraclavicular nodes did not demonstrate any abnormal lymph nodes or masses. No parotid masses. Palpation of the thyroid was soft and smooth, with no nodules or goiter appreciated.  The trachea was mobile and midline.  Cardiovascular - carotid pulses are 2+ bilaterally, regular rhythm    NASAL ENDOSCOPY -I sprayed both nasal cavities with phenylephrine lidocaine spray.  I used a 2.7 mm rigid nasal endoscope.  I first entered the left side.  The patient had significant nasal polyposis coming down both from the olfactory groove and middle meatus going anteriorly into the nasal cavity.  I took photographs.  There is a little bit of crust on one polyp.  I slid this on the floor the nose he had more polyps in the sphenoethmoid recess but not quite as large.  No pus.  Nasal fracture is normal.  I pulled the scope out looked at the right nasal cavity.  The right septum deviates some with a spur.  He also had some polyps in the right middle meatus and sphenoethmoid recess but they are smaller when compared to the left side.  No pus.  Scope was removed.    A/P - Cleveland Maciel is a 51 year old male with fullness and pressure in sinuses and right forehead pressure.  He has felt a little bit better after 2 courses of antibiotics.  He has chronic sinusitis and nasal polyposis.  This is likely blocking the sinuses causing infection.  I recommend a prednisone burst and taper, Flonase, nasal saline irrigations.  Because the purulence seems  to have resolved I do not think he needs another round of antibiotics.  However I did explain his possible mood out of try that.  We also touched on endoscopic sinus surgery to remove polyps, but I would only recommend that if he fails maximal medical treatment.  Recheck in 3 months or as needed.      Lopez Turner MD  Otolaryngology  [facility]

## 2021-09-10 ENCOUNTER — OFFICE VISIT (OUTPATIENT)
Dept: FAMILY MEDICINE | Facility: CLINIC | Age: 51
End: 2021-09-10
Payer: COMMERCIAL

## 2021-09-10 VITALS
OXYGEN SATURATION: 99 % | HEART RATE: 66 BPM | WEIGHT: 196 LBS | TEMPERATURE: 97.5 F | SYSTOLIC BLOOD PRESSURE: 137 MMHG | DIASTOLIC BLOOD PRESSURE: 82 MMHG | RESPIRATION RATE: 18 BRPM

## 2021-09-10 DIAGNOSIS — J32.9 SINUSITIS, UNSPECIFIED CHRONICITY, UNSPECIFIED LOCATION: ICD-10-CM

## 2021-09-10 DIAGNOSIS — R53.83 FATIGUE, UNSPECIFIED TYPE: Primary | ICD-10-CM

## 2021-09-10 DIAGNOSIS — Z12.5 SCREENING PSA (PROSTATE SPECIFIC ANTIGEN): ICD-10-CM

## 2021-09-10 DIAGNOSIS — Z13.220 SCREENING CHOLESTEROL LEVEL: ICD-10-CM

## 2021-09-10 DIAGNOSIS — J33.9 NASAL POLYP: ICD-10-CM

## 2021-09-10 LAB
ALBUMIN SERPL-MCNC: 4.2 G/DL (ref 3.4–5)
ALP SERPL-CCNC: 69 U/L (ref 40–150)
ALT SERPL W P-5'-P-CCNC: 28 U/L (ref 0–70)
ANION GAP SERPL CALCULATED.3IONS-SCNC: 3 MMOL/L (ref 3–14)
AST SERPL W P-5'-P-CCNC: 19 U/L (ref 0–45)
BASOPHILS # BLD AUTO: 0 10E3/UL (ref 0–0.2)
BASOPHILS NFR BLD AUTO: 1 %
BILIRUB SERPL-MCNC: 1 MG/DL (ref 0.2–1.3)
BUN SERPL-MCNC: 11 MG/DL (ref 7–30)
CALCIUM SERPL-MCNC: 8.9 MG/DL (ref 8.5–10.1)
CHLORIDE BLD-SCNC: 105 MMOL/L (ref 94–109)
CHOLEST SERPL-MCNC: 166 MG/DL
CO2 SERPL-SCNC: 30 MMOL/L (ref 20–32)
CREAT SERPL-MCNC: 1.01 MG/DL (ref 0.66–1.25)
DEPRECATED CALCIDIOL+CALCIFEROL SERPL-MC: 51 UG/L (ref 20–75)
EOSINOPHIL # BLD AUTO: 0.5 10E3/UL (ref 0–0.7)
EOSINOPHIL NFR BLD AUTO: 7 %
ERYTHROCYTE [DISTWIDTH] IN BLOOD BY AUTOMATED COUNT: 12.5 % (ref 10–15)
FASTING STATUS PATIENT QL REPORTED: YES
GFR SERPL CREATININE-BSD FRML MDRD: 86 ML/MIN/1.73M2
GLUCOSE BLD-MCNC: 91 MG/DL (ref 70–99)
HCT VFR BLD AUTO: 46.8 % (ref 40–53)
HDLC SERPL-MCNC: 93 MG/DL
HGB BLD-MCNC: 15.8 G/DL (ref 13.3–17.7)
LDLC SERPL CALC-MCNC: 62 MG/DL
LYMPHOCYTES # BLD AUTO: 1.5 10E3/UL (ref 0.8–5.3)
LYMPHOCYTES NFR BLD AUTO: 22 %
MCH RBC QN AUTO: 29.9 PG (ref 26.5–33)
MCHC RBC AUTO-ENTMCNC: 33.8 G/DL (ref 31.5–36.5)
MCV RBC AUTO: 89 FL (ref 78–100)
MONOCYTES # BLD AUTO: 0.7 10E3/UL (ref 0–1.3)
MONOCYTES NFR BLD AUTO: 10 %
NEUTROPHILS # BLD AUTO: 4.2 10E3/UL (ref 1.6–8.3)
NEUTROPHILS NFR BLD AUTO: 61 %
NONHDLC SERPL-MCNC: 73 MG/DL
PLATELET # BLD AUTO: 203 10E3/UL (ref 150–450)
POTASSIUM BLD-SCNC: 4.6 MMOL/L (ref 3.4–5.3)
PROT SERPL-MCNC: 7.6 G/DL (ref 6.8–8.8)
RBC # BLD AUTO: 5.29 10E6/UL (ref 4.4–5.9)
SODIUM SERPL-SCNC: 138 MMOL/L (ref 133–144)
TRIGL SERPL-MCNC: 53 MG/DL
TSH SERPL DL<=0.005 MIU/L-ACNC: 1.28 MU/L (ref 0.4–4)
WBC # BLD AUTO: 7 10E3/UL (ref 4–11)

## 2021-09-10 PROCEDURE — 82306 VITAMIN D 25 HYDROXY: CPT | Performed by: PHYSICIAN ASSISTANT

## 2021-09-10 PROCEDURE — 36415 COLL VENOUS BLD VENIPUNCTURE: CPT | Performed by: PHYSICIAN ASSISTANT

## 2021-09-10 PROCEDURE — G0103 PSA SCREENING: HCPCS | Performed by: PHYSICIAN ASSISTANT

## 2021-09-10 PROCEDURE — 99213 OFFICE O/P EST LOW 20 MIN: CPT | Performed by: PHYSICIAN ASSISTANT

## 2021-09-10 PROCEDURE — 80061 LIPID PANEL: CPT | Performed by: PHYSICIAN ASSISTANT

## 2021-09-10 PROCEDURE — 80050 GENERAL HEALTH PANEL: CPT | Performed by: PHYSICIAN ASSISTANT

## 2021-09-10 ASSESSMENT — PAIN SCALES - GENERAL: PAINLEVEL: MILD PAIN (2)

## 2021-09-13 ENCOUNTER — OFFICE VISIT (OUTPATIENT)
Dept: FAMILY MEDICINE | Facility: CLINIC | Age: 51
End: 2021-09-13
Payer: COMMERCIAL

## 2021-09-13 VITALS
RESPIRATION RATE: 20 BRPM | BODY MASS INDEX: 28.92 KG/M2 | TEMPERATURE: 97.6 F | SYSTOLIC BLOOD PRESSURE: 136 MMHG | HEIGHT: 70 IN | WEIGHT: 202 LBS | OXYGEN SATURATION: 98 % | HEART RATE: 89 BPM | DIASTOLIC BLOOD PRESSURE: 80 MMHG

## 2021-09-13 DIAGNOSIS — R53.83 FATIGUE, UNSPECIFIED TYPE: ICD-10-CM

## 2021-09-13 DIAGNOSIS — Z12.5 SCREENING PSA (PROSTATE SPECIFIC ANTIGEN): ICD-10-CM

## 2021-09-13 DIAGNOSIS — Z00.00 ROUTINE GENERAL MEDICAL EXAMINATION AT A HEALTH CARE FACILITY: Primary | ICD-10-CM

## 2021-09-13 LAB — PSA SERPL-MCNC: 0.91 UG/L (ref 0–4)

## 2021-09-13 PROCEDURE — 99396 PREV VISIT EST AGE 40-64: CPT | Performed by: FAMILY MEDICINE

## 2021-09-13 ASSESSMENT — ENCOUNTER SYMPTOMS
HEARTBURN: 0
WEAKNESS: 0
NAUSEA: 0
FEVER: 0
PARESTHESIAS: 0
SORE THROAT: 0
JOINT SWELLING: 0
DIARRHEA: 0
EYE PAIN: 0
ABDOMINAL PAIN: 0
HEADACHES: 0
DYSURIA: 0
CHILLS: 0
COUGH: 0
HEMATOCHEZIA: 0
ARTHRALGIAS: 0
NERVOUS/ANXIOUS: 0
FREQUENCY: 0
SHORTNESS OF BREATH: 0
DIZZINESS: 0
MYALGIAS: 0
PALPITATIONS: 0
CONSTIPATION: 0
HEMATURIA: 0

## 2021-09-13 ASSESSMENT — MIFFLIN-ST. JEOR: SCORE: 1769.58

## 2021-09-13 NOTE — RESULT ENCOUNTER NOTE
IMPRESSION:    s/p fall / head CT neg  ESRD MWF for HD  Acute hypoxemic resp failure failed weaning  NSTEMI  Afib was on coumadin  severe COVID PNA  Superimposed bacteria PNA  inrease WBC/ diarrhea  C diff -  sacral ulcer    PLAN:    CNS: SAT. precedex, FENTANYL    HEENT: Oral care.    PULMONARY: Vent changes. Wean O2.  Monitor PPL and DP.  , increase rate, PEEP 8 trach    CARDIOVASCULAR:  I<O as tolerated.        GI: GI prophylaxis.  OG Feeding.  Bowel regimen     RENAL: check lytes and replete PRN. Nephro F/UP result  on HD    INFECTIOUS DISEASE: Dexamethasone 6 mg finished    HEMATOLOGICAL: DVT Prophylaxis  IV Heparin FU PTT     ENDOCRINE:  Follow up FS.  Insulin protocol if needed.    MUSCULOSKELETAL: Increase as tolerated.  burn eval  MICU     Prognosis poor    Mr. Maciel,    All of your labs were normal/near normal for you.    Please contact the clinic if you have additional questions.  Thank you.    Sincerely,    Omi Ibarra PA-C

## 2021-09-13 NOTE — PROGRESS NOTES
SUBJECTIVE:   CC: Cleveland Maciel is an 51 year old male who presents for preventative health visit.     Seen for fatigue/sinus congestion last week and normal labs. Seeing ENT.   Given antibiotics and prednisone - polyps seen. No major snoring or hernandez.   Overall improving sinuses and fatigue better. No chest pain or shortness of breath.   Colonoscopy - next month. No family history colon cancer. No black/bloody stools. No urine  Changes or hematuria. Urine flow ok. 1-2 cups//coffee. No pop.   2 glasses - wine most nights. Daily working out. Weights/cardiac.  Dad heavy ALCOHOLism  at 51yo - GI bleed.   Mom - alive and ok. Paternal side otherwise ok.   One younger brother healthy.   No excessive ALCOHOL. Emotionally doing ok. 21, 9 yo kids.  Work retired. Dentist recently. Eye exam one year ok.   No moles/rashes. No milk.   No testicle pain/masses/hernia or std concerns.         Patient has been advised of split billing requirements and indicates understanding: Yes  Healthy Habits:     Getting at least 3 servings of Calcium per day:  Yes    Bi-annual eye exam:  Yes    Dental care twice a year:  Yes    Sleep apnea or symptoms of sleep apnea:  None    Diet:  Regular (no restrictions)    Frequency of exercise:  4-5 days/week    Duration of exercise:  30-45 minutes    Taking medications regularly:  No    Medication side effects:  None    PHQ-2 Total Score: 0    Additional concerns today:  No        Today's PHQ-2 Score:   PHQ-2 (  [employer]) 9/10/2021   Q1: Little interest or pleasure in doing things 0   Q2: Feeling down, depressed or hopeless 0   PHQ-2 Score 0         Social History     Tobacco Use     Smoking status: Never Smoker     Smokeless tobacco: Current User     Types: Chew   Substance Use Topics     Alcohol use: Yes       Last PSA: No results found for: PSA    Reviewed orders with patient. Reviewed health maintenance and updated orders accordingly - Yes  Lab work is in process    Reviewed and updated as  "needed this visit by clinical staff                 Reviewed and updated as needed this visit by Provider                  Review of Systems   Constitutional: Negative for chills and fever.   HENT: Negative for congestion, ear pain, hearing loss and sore throat.    Eyes: Negative for pain and visual disturbance.   Respiratory: Negative for cough and shortness of breath.    Cardiovascular: Negative for chest pain, palpitations and peripheral edema.   Gastrointestinal: Negative for abdominal pain, constipation, diarrhea, heartburn, hematochezia and nausea.   Genitourinary: Negative for discharge, dysuria, frequency, genital sores, hematuria, impotence and urgency.   Musculoskeletal: Negative for arthralgias, joint swelling and myalgias.   Skin: Negative for rash.   Neurological: Negative for dizziness, weakness, headaches and paresthesias.   Psychiatric/Behavioral: Negative for mood changes. The patient is not nervous/anxious.        OBJECTIVE:   /80   Pulse 89   Temp 97.6  F (36.4  C)   Resp 20   Ht 1.765 m (5' 9.5\")   Wt 91.6 kg (202 lb)   SpO2 98%   BMI 29.40 kg/m     Physical Exam  GENERAL: healthy, alert and no distress  EYES: Eyes grossly normal to inspection, PERRL and conjunctivae and sclerae normal  HENT: ear canals and TM's normal, nose and mouth without ulcers or lesions  NECK: no adenopathy, no asymmetry, masses, or scars and thyroid normal to palpation  RESP: lungs clear to auscultation - no rales, rhonchi or wheezes  BREAST: normal without masses, tenderness or nipple discharge and no palpable axillary masses or adenopathy  CV: regular rate and rhythm, normal S1 S2, no S3 or S4, no murmur, click or rub, no peripheral edema and peripheral pulses strong  ABDOMEN: soft, nontender, no hepatosplenomegaly, no masses and bowel sounds normal  RECTAL (male): patient deferred /rectal exams.   MS: no gross musculoskeletal defects noted, no edema  SKIN: no suspicious lesions or rashes  NEURO: Normal " strength and tone, mentation intact and speech normal  BACK: no CVA tenderness, no paralumbar tenderness  PSYCH: mentation appears normal, affect normal/bright  LYMPH: no cervical, supraclavicular, axillary, or inguinal adenopathy    ASSESSMENT/PLAN:   ASSESSMENT / PLAN:  (Z00.00) Routine general medical examination at a health care facility  (primary encounter diagnosis)  Comment: generally healthy and normal exam/labs. Lipids ok. Blood pressure ok and non-smoker  Plan: vitaminD supplement in winter and colonoscopy set-up.  Limit ALCOHOL. Reviewed self mole/testicle check handout.  Call/email with questions/concerns. Continue exercise.     (Z12.5) Screening PSA (prostate specific antigen)  Plan: PSA, screen        Expected course and warning signs reviewed.     (R53.83) Fatigue, unspecified type  Comment: improving. Likely from sinus  Plan: continue exercise. Limit ALCOHOL. Return to clinic if worse/new symptoms. Consider sleep study.         Patient has been advised of split billing requirements and indicates understanding: Yes  COUNSELING:   Reviewed preventive health counseling, as reflected in patient instructions       Regular exercise       Healthy diet/nutrition       Vision screening       Hearing screening       Colon cancer screening       Prostate cancer screening       Osteoporosis prevention/bone health    There is no height or weight on file to calculate BMI.         He reports that he has never smoked. His smokeless tobacco use includes chew.      Counseling Resources:  ATP IV Guidelines  Pooled Cohorts Equation Calculator  FRAX Risk Assessment  ICSI Preventive Guidelines  Dietary Guidelines for Americans, 2010  USDA's MyPlate  ASA Prophylaxis  Lung CA Screening    Donovan Pride MD  [facility]

## 2021-10-15 ENCOUNTER — OFFICE VISIT (OUTPATIENT)
Dept: OTOLARYNGOLOGY | Facility: CLINIC | Age: 51
End: 2021-10-15
Payer: COMMERCIAL

## 2021-10-15 ENCOUNTER — ANCILLARY PROCEDURE (OUTPATIENT)
Dept: CT IMAGING | Facility: CLINIC | Age: 51
End: 2021-10-15
Attending: OTOLARYNGOLOGY
Payer: COMMERCIAL

## 2021-10-15 VITALS
RESPIRATION RATE: 16 BRPM | HEART RATE: 72 BPM | SYSTOLIC BLOOD PRESSURE: 144 MMHG | OXYGEN SATURATION: 98 % | DIASTOLIC BLOOD PRESSURE: 87 MMHG

## 2021-10-15 DIAGNOSIS — Z11.59 ENCOUNTER FOR SCREENING FOR OTHER VIRAL DISEASES: ICD-10-CM

## 2021-10-15 DIAGNOSIS — J33.9 NASAL POLYP: ICD-10-CM

## 2021-10-15 DIAGNOSIS — J32.4 CHRONIC PANSINUSITIS: ICD-10-CM

## 2021-10-15 DIAGNOSIS — J34.89 NASAL OBSTRUCTION: ICD-10-CM

## 2021-10-15 DIAGNOSIS — J34.2 NASAL SEPTAL DEVIATION: ICD-10-CM

## 2021-10-15 DIAGNOSIS — J32.4 CHRONIC PANSINUSITIS: Primary | ICD-10-CM

## 2021-10-15 DIAGNOSIS — J34.3 NASAL TURBINATE HYPERTROPHY: ICD-10-CM

## 2021-10-15 PROCEDURE — 99214 OFFICE O/P EST MOD 30 MIN: CPT | Performed by: OTOLARYNGOLOGY

## 2021-10-15 PROCEDURE — 70486 CT MAXILLOFACIAL W/O DYE: CPT | Mod: TC | Performed by: RADIOLOGY

## 2021-10-15 NOTE — PROGRESS NOTES
Chief Complaint - headache, sinus pressure    History of Present Illness - Cleveland Maciel is a 51 year old male who returns to check chronic rhinosinusitis and nasal polyps. I saw him 9/9/21. Scope exam showed nasal polyps. The patient describes symptoms of pain above right eye, feeling unwell, head pressure/fullness for the past couple months. He had something similar 20 years ago. CT then showed sinusitis, and this improved with antibiotics. This time he was given antibiotics, it helped some, but then symptoms worsened. He tried doxycycline and he has felt a little better. He gets fatigued, no energy. No vision symptoms. no nasal symptoms now, but when it started he had nasal congestion. He also tried flonase. Nasal congestion has gone away. His breathing is okay. He still feels he sounds a little congestion. He hasn't been able to smell for 15 years. No prior history of sinus surgery.     I gave him a prednisone burst and taper, nasal saline irrigations, flonase. He returns and notes he felt better with the prednisone. He could smell again. His forehead pressure went away. Unfortunately symptoms have returned some.      Past Medical History - healthy    Current Medications -   Current Outpatient Medications:      fluticasone (FLONASE) 50 MCG/ACT nasal spray, Spray 1 spray into both nostrils daily, Disp: , Rfl:      loratadine (CLARITIN) 10 MG tablet, Take 10 mg by mouth daily (Patient not taking: Reported on 9/10/2021), Disp: , Rfl:      predniSONE (DELTASONE) 20 MG tablet, Take 60 mg daily for 7 days, 40 mg daily for 2 days, 20 mg daily for 2 days, 10 mg daily for 2 days, Disp: 41 tablet, Rfl: 0    Allergies - No Known Allergies    Social History -   Social History     Socioeconomic History     Marital status:      Spouse name: Not on file     Number of children: Not on file     Years of education: Not on file     Highest education level: Not on file   Occupational History     Not on file   Tobacco Use      Smoking status: Never Smoker     Smokeless tobacco: Current User     Types: Chew   Substance and Sexual Activity     Alcohol use: Not on file     Drug use: Not on file     Sexual activity: Not on file   Other Topics Concern     Not on file   Social History Narrative     Not on file     Social Determinants of Health     Financial Resource Strain:      Difficulty of Paying Living Expenses:    Food Insecurity:      Worried About Running Out of Food in the Last Year:      Ran Out of Food in the Last Year:    Transportation Needs:      Lack of Transportation (Medical):      Lack of Transportation (Non-Medical):    Physical Activity:      Days of Exercise per Week:      Minutes of Exercise per Session:    Stress:      Feeling of Stress :    Social Connections:      Frequency of Communication with Friends and Family:      Frequency of Social Gatherings with Friends and Family:      Attends Oriental orthodox Services:      Active Member of Clubs or Organizations:      Attends Club or Organization Meetings:      Marital Status:    Intimate Partner Violence:      Fear of Current or Ex-Partner:      Emotionally Abused:      Physically Abused:      Sexually Abused:        Family History - no polyps noted in family    Review of Systems - As per HPI and PMHx, otherwise 10+ comprehensive system review is negative.    Physical Exam  BP (!) 144/87   Pulse 72   Resp 16   SpO2 98%   General - The patient is nontoxic, in no distress. Alert and oriented to person and place, answers questions and cooperates with examination appropriately.   Neurologic - CN II-XII are intact. No focal neurologic deficits.   Voice and Breathing - The patient was breathing comfortably without the use of accessory muscles. There was no wheezing, stridor, or stertor.  The patients voice was clear and strong.  Eyes - Extraocular movements intact. Sclera were not icteric or injected, conjunctiva were pink and moist.  Mouth - Examination of the oral cavity showed  pink, healthy oral mucosa. No lesions or ulcerations noted.  The tongue was mobile and midline.  Throat - The walls of the oropharynx were smooth, symmetric, and had no lesions or ulcerations.  No postnasal drainage.  The uvula was midline on elevation.  Nose - External contour is symmetric, no gross deflection or scars.  Nasal mucosa is pink and moist with no abnormal mucus.  The septum was to the right significantly, turbinates of normal size and position.  + polyps left nasal cavity down to the floor.       A/P - Cleveland Maciel is a 51 year old male with chronic rhinosinusitis and nasal polyps and septal deviation with turbinate hypertrophy, poor smell, sinus pressure.  He did better with prednisone, but unfortunately symptoms have returned.  The positive effect was short-lived.  He is also try nasal saline irrigations and Flonase without much benefit.  Therefore I recommend image guided endoscopic sinus surgery all sinuses with nasal polyp removal, septoplasty, bilateral inferior turbinate reduction.    I will have him get a CT sinus for image guidance.  We also discussed the surgery in detail.  He knows that polyps can regrow, his sense of smell likely come back some but as polyps regrow could fade away and the degree to which the sense of smell returns is variable.  He also understands he will need further medical management to suppress polyp regrowth.    The basic premise of functional endoscopic sinus surgery was discussed at length.  This included the concept behind restoration of the natural drainage pathways for the paranasal sinuses using the latest minimally invasive, minimally traumatic techniques and instruments.    Risks discussed included the risks on infection, bleeding, the risks of general anesthesia.  Also specific to functional endoscopic sinus surgery, the risks of permanent damage to the eyes/vision, tear ducts, sense of smell, base of skull/brain, and CSF leak were described.  And finally,  the possibility that functional endoscopic sinus surgery may not relieve sinus disease, and that there might be continued need for medical management was also discussed.  The patient understood and wished to proceed with scheduling.    Lopez Turner MD  Otolaryngology  [facility]

## 2021-10-15 NOTE — LETTER
10/15/2021         RE: Cleveland Maciel  [address] [facility] [address]        Dear Colleague,    Thank you for referring your patient, Cleveland Maciel, to the [facility]. Please see a copy of my visit note below.    Chief Complaint - headache, sinus pressure    History of Present Illness - Cleveland Maciel is a 51 year old male who returns to check chronic rhinosinusitis and nasal polyps. I saw him 9/9/21. Scope exam showed nasal polyps. The patient describes symptoms of pain above right eye, feeling unwell, head pressure/fullness for the past couple months. He had something similar 20 years ago. CT then showed sinusitis, and this improved with antibiotics. This time he was given antibiotics, it helped some, but then symptoms worsened. He tried doxycycline and he has felt a little better. He gets fatigued, no energy. No vision symptoms. no nasal symptoms now, but when it started he had nasal congestion. He also tried flonase. Nasal congestion has gone away. His breathing is okay. He still feels he sounds a little congestion. He hasn't been able to smell for 15 years. No prior history of sinus surgery.     I gave him a prednisone burst and taper, nasal saline irrigations, flonase. He returns and notes he felt better with the prednisone. He could smell again. His forehead pressure went away. Unfortunately symptoms have returned some.      Past Medical History - healthy    Current Medications -   Current Outpatient Medications:      fluticasone (FLONASE) 50 MCG/ACT nasal spray, Spray 1 spray into both nostrils daily, Disp: , Rfl:      loratadine (CLARITIN) 10 MG tablet, Take 10 mg by mouth daily (Patient not taking: Reported on 9/10/2021), Disp: , Rfl:      predniSONE (DELTASONE) 20 MG tablet, Take 60 mg daily for 7 days, 40 mg daily for 2 days, 20 mg daily for 2 days, 10 mg daily for 2 days, Disp: 41 tablet, Rfl: 0    Allergies - No Known Allergies    Social History -    Social History     Socioeconomic History     Marital status:      Spouse name: Not on file     Number of children: Not on file     Years of education: Not on file     Highest education level: Not on file   Occupational History     Not on file   Tobacco Use     Smoking status: Never Smoker     Smokeless tobacco: Current User     Types: Chew   Substance and Sexual Activity     Alcohol use: Not on file     Drug use: Not on file     Sexual activity: Not on file   Other Topics Concern     Not on file   Social History Narrative     Not on file     Social Determinants of Health     Financial Resource Strain:      Difficulty of Paying Living Expenses:    Food Insecurity:      Worried About Running Out of Food in the Last Year:      Ran Out of Food in the Last Year:    Transportation Needs:      Lack of Transportation (Medical):      Lack of Transportation (Non-Medical):    Physical Activity:      Days of Exercise per Week:      Minutes of Exercise per Session:    Stress:      Feeling of Stress :    Social Connections:      Frequency of Communication with Friends and Family:      Frequency of Social Gatherings with Friends and Family:      Attends Oriental orthodox Services:      Active Member of Clubs or Organizations:      Attends Club or Organization Meetings:      Marital Status:    Intimate Partner Violence:      Fear of Current or Ex-Partner:      Emotionally Abused:      Physically Abused:      Sexually Abused:        Family History - no polyps noted in family    Review of Systems - As per HPI and PMHx, otherwise 10+ comprehensive system review is negative.    Physical Exam  BP (!) 144/87   Pulse 72   Resp 16   SpO2 98%   General - The patient is nontoxic, in no distress. Alert and oriented to person and place, answers questions and cooperates with examination appropriately.   Neurologic - CN II-XII are intact. No focal neurologic deficits.   Voice and Breathing - The patient was breathing comfortably without the  use of accessory muscles. There was no wheezing, stridor, or stertor.  The patients voice was clear and strong.  Eyes - Extraocular movements intact. Sclera were not icteric or injected, conjunctiva were pink and moist.  Mouth - Examination of the oral cavity showed pink, healthy oral mucosa. No lesions or ulcerations noted.  The tongue was mobile and midline.  Throat - The walls of the oropharynx were smooth, symmetric, and had no lesions or ulcerations.  No postnasal drainage.  The uvula was midline on elevation.  Nose - External contour is symmetric, no gross deflection or scars.  Nasal mucosa is pink and moist with no abnormal mucus.  The septum was to the right significantly, turbinates of normal size and position.  + polyps left nasal cavity down to the floor.       A/P - Cleveland Maciel is a 51 year old male with chronic rhinosinusitis and nasal polyps and septal deviation with turbinate hypertrophy, poor smell, sinus pressure.  He did better with prednisone, but unfortunately symptoms have returned.  The positive effect was short-lived.  He is also try nasal saline irrigations and Flonase without much benefit.  Therefore I recommend image guided endoscopic sinus surgery all sinuses with nasal polyp removal, septoplasty, bilateral inferior turbinate reduction.    I will have him get a CT sinus for image guidance.  We also discussed the surgery in detail.  He knows that polyps can regrow, his sense of smell likely come back some but as polyps regrow could fade away and the degree to which the sense of smell returns is variable.  He also understands he will need further medical management to suppress polyp regrowth.    The basic premise of functional endoscopic sinus surgery was discussed at length.  This included the concept behind restoration of the natural drainage pathways for the paranasal sinuses using the latest minimally invasive, minimally traumatic techniques and instruments.    Risks discussed  included the risks on infection, bleeding, the risks of general anesthesia.  Also specific to functional endoscopic sinus surgery, the risks of permanent damage to the eyes/vision, tear ducts, sense of smell, base of skull/brain, and CSF leak were described.  And finally, the possibility that functional endoscopic sinus surgery may not relieve sinus disease, and that there might be continued need for medical management was also discussed.  The patient understood and wished to proceed with scheduling.    Lopez Turner MD  Otolaryngology  [facility]        Again, thank you for allowing me to participate in the care of your patient.        Sincerely,        Lopez Turner MD

## 2021-10-18 ENCOUNTER — TELEPHONE (OUTPATIENT)
Dept: OTOLARYNGOLOGY | Facility: CLINIC | Age: 51
End: 2021-10-18
Payer: COMMERCIAL

## 2021-10-18 NOTE — TELEPHONE ENCOUNTER
Type of surgery: sinus surgery,endoscopic,using optical tracking system-all sinuses;septoplasty;bilateral inferior turbinate reduction (bilateral)  CPT 55972,81435,[id number],86173  Chronic pansinusitis J32.4     Nasal polyp J33.9     Nasal septal deviation J34.2     Nasal turbinate hypertrophy J34.3     Nasal obstruction J34.89    Location of surgery: [facility]  Date and time of surgery: 11-15-21  TBD  Surgeon: Dr Turner  Pre-Op Appt Date: 11-8-21  Post-Op Appt Date: 11-23-21 & 12-3-21   Packet sent out: Yes  Pre-cert/Authorization completed: No prior auth needed per [employer] online list.     Date: 10-18-21    Kelly Johnson  Prior Authorization Dept  [phone]

## 2021-11-08 ENCOUNTER — OFFICE VISIT (OUTPATIENT)
Dept: FAMILY MEDICINE | Facility: CLINIC | Age: 51
End: 2021-11-08
Payer: COMMERCIAL

## 2021-11-08 VITALS
SYSTOLIC BLOOD PRESSURE: 141 MMHG | DIASTOLIC BLOOD PRESSURE: 81 MMHG | BODY MASS INDEX: 29.4 KG/M2 | OXYGEN SATURATION: 97 % | TEMPERATURE: 97 F | WEIGHT: 202 LBS | HEART RATE: 68 BPM

## 2021-11-08 DIAGNOSIS — J32.0 CHRONIC MAXILLARY SINUSITIS: ICD-10-CM

## 2021-11-08 DIAGNOSIS — R03.0 ELEVATED BLOOD PRESSURE READING WITHOUT DIAGNOSIS OF HYPERTENSION: ICD-10-CM

## 2021-11-08 DIAGNOSIS — R94.31 ABNORMAL ELECTROCARDIOGRAM: ICD-10-CM

## 2021-11-08 DIAGNOSIS — Z01.818 PREOP GENERAL PHYSICAL EXAM: Primary | ICD-10-CM

## 2021-11-08 PROCEDURE — 99214 OFFICE O/P EST MOD 30 MIN: CPT | Performed by: FAMILY MEDICINE

## 2021-11-08 PROCEDURE — 93000 ELECTROCARDIOGRAM COMPLETE: CPT | Performed by: FAMILY MEDICINE

## 2021-11-08 NOTE — PROGRESS NOTES
[facility]  [address] [facility] [address]  Phone: [phone]  Primary Provider: Sonya Carolina  Pre-op Performing Provider: SONYA CAROLINA      PREOPERATIVE EVALUATION:  Today's date: 11/8/2021    Cleveland Maciel is a 51 year old male who presents for a preoperative evaluation.    Borderline blood pressure.   Blood pressure cuff at home. Active work out a lot. No chest pain or shortness of breath.   Limited sodium in diet.   No fevers or chills. No cold symptoms. No nausea, vomiting or diarrhea or black/bloody stools. Colonoscopy - awaiting in 2 months.   No urine changes or hematuria. No bleeding issues.  Non-smoker.   Lipids excellent.    Surgical Information:  Surgery/Procedure: Sinus surgery endoscopic using optical tracking system   Surgery Location:  OR  Surgeon: Johnny  Surgery Date: 11/15/21  Time of Surgery: 7:45  Where patient plans to recover: At home with family  Fax number for surgical facility: Note does not need to be faxed, will be available electronically in Epic.    Type of Anesthesia Anticipated: to be determined    ASSESSMENT / PLAN:  (Z01.818) Preop general physical exam  (primary encounter diagnosis)  Comment: denies chest pain or shortness of breath. No old ekgs. Normal cmp/cbc/lipids last month  Plan: EKG 12-lead complete w/read - Clinics        Hold asa/nsaids/ETOH one week before.   Ok for low risk procedure. Excellent exercise tolerance    (J32.0) Chronic maxillary sinusitis  Plan: per ENT    (R94.31) Abnormal electrocardiogram  Comment: no old ekg available. No symptoms.   Plan: Echocardiogram Complete        Check echo    (R03.0) Elevated blood pressure reading without diagnosis of hypertension  Comment: a little nervous. White coat  Plan: Echocardiogram Complete        Continue self-monitor/exercise and lower sodium. Start norvasc if worse or echo finding. Chest pain or shortness of breath to er. Non-smoker and normal lipids. Call/email  with questions/concerns         Subjective     HPI related to upcoming procedure: sinus surgery      Preop Questions 11/8/2021   1. Have you ever had a heart attack or stroke? No   2. Have you ever had surgery on your heart or blood vessels, such as a stent placement, a coronary artery bypass, or surgery on an artery in your head, neck, heart, or legs? YES - Abrasion on leg    3. Do you have chest pain with activity? No   4. Do you have a history of  heart failure? No   5. Do you currently have a cold, bronchitis or symptoms of other infection? No   6. Do you have a cough, shortness of breath, or wheezing? No   7. Do you or anyone in your family have previous history of blood clots? No   8. Do you or does anyone in your family have a serious bleeding problem such as prolonged bleeding following surgeries or cuts? No   9. Have you ever had problems with anemia or been told to take iron pills? No   10. Have you had any abnormal blood loss such as black, tarry or bloody stools? No   11. Have you ever had a blood transfusion? No   12. Are you willing to have a blood transfusion if it is medically needed before, during, or after your surgery? Yes   13. Have you or any of your relatives ever had problems with anesthesia? No   14. Do you have sleep apnea, excessive snoring or daytime drowsiness? No   15. Do you have any artifical heart valves or other implanted medical devices like a pacemaker, defibrillator, or continuous glucose monitor? No   16. Do you have artificial joints? No   17. Are you allergic to latex? No     Health Care Directive:  Not interested    Preoperative Review of :   reviewed - no record of controlled substances prescribed.          Review of Systems  All other ROS negative    Patient Active Problem List    Diagnosis Date Noted     Chronic pansinusitis 10/15/2021     Priority: Medium     Added automatically from request for surgery [id number]       Nasal polyp 10/15/2021     Priority: Medium      Added automatically from request for surgery [id number]       Nasal septal deviation 10/15/2021     Priority: Medium     Added automatically from request for surgery [id number]       Nasal turbinate hypertrophy 10/15/2021     Priority: Medium     Added automatically from request for surgery [id number]       Nasal obstruction 10/15/2021     Priority: Medium     Added automatically from request for surgery [id number]        Past Medical History:   Diagnosis Date     ADD (attention deficit disorder)      Past Surgical History:   Procedure Laterality Date     KNEE SURGERY Left     ACL     VARICOCELECTOMY       Current Outpatient Medications   Medication Sig Dispense Refill     fluticasone (FLONASE) 50 MCG/ACT nasal spray Spray 1 spray into both nostrils daily       loratadine (CLARITIN) 10 MG tablet Take 10 mg by mouth daily (Patient not taking: Reported on 9/10/2021)       predniSONE (DELTASONE) 20 MG tablet Take 60 mg daily for 7 days, 40 mg daily for 2 days, 20 mg daily for 2 days, 10 mg daily for 2 days (Patient not taking: Reported on 10/15/2021) 41 tablet 0       No Known Allergies     Social History     Tobacco Use     Smoking status: Never Smoker     Smokeless tobacco: Current User     Types: Chew   Substance Use Topics     Alcohol use: Yes       History   Drug Use Unknown         Objective     BP (!) 141/81   Pulse 68   Temp 97  F (36.1  C) (Tympanic)   Wt 91.6 kg (202 lb)   SpO2 97%   BMI 29.40 kg/m     Physical Exam  GENERAL APPEARANCE: healthy, alert and no distress  EYES: Eyes grossly normal to inspection, PERRL and conjunctivae and sclerae normal  HENT: ear canals and TM's normal and nose and mouth without ulcers or lesions  NECK: no adenopathy, no asymmetry, masses, or scars and thyroid normal to palpation  RESP: lungs clear to auscultation - no rales, rhonchi or wheezes  CV: regular rate and rhythm, normal S1 S2, no S3 or S4 and no murmur, click or rub   ABDOMEN: soft, nontender, no HSM or masses and bowel  sounds normal  MS: extremities normal- no gross deformities noted  NEURO: Normal strength and tone, sensory exam grossly normal, mentation intact and speech normal  PSYCH: mentation appears normal and affect normal/bright    Recent Labs   Lab Test 09/10/21  1017   HGB 15.8         POTASSIUM 4.6   CR 1.01        Diagnostics:  No labs were ordered during this visit.   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

## 2021-11-08 NOTE — H&P (VIEW-ONLY)
[facility]  [address] [facility] [address]  Phone: [phone]  Primary Provider: Sonya Carolina  Pre-op Performing Provider: SONYA CAROLINA      PREOPERATIVE EVALUATION:  Today's date: 11/8/2021    Cleveland Maciel is a 51 year old male who presents for a preoperative evaluation.    Borderline blood pressure.   Blood pressure cuff at home. Active work out a lot. No chest pain or shortness of breath.   Limited sodium in diet.   No fevers or chills. No cold symptoms. No nausea, vomiting or diarrhea or black/bloody stools. Colonoscopy - awaiting in 2 months.   No urine changes or hematuria. No bleeding issues.  Non-smoker.   Lipids excellent.    Surgical Information:  Surgery/Procedure: Sinus surgery endoscopic using optical tracking system   Surgery Location:  OR  Surgeon: Johnny  Surgery Date: 11/15/21  Time of Surgery: 7:45  Where patient plans to recover: At home with family  Fax number for surgical facility: Note does not need to be faxed, will be available electronically in Epic.    Type of Anesthesia Anticipated: to be determined    ASSESSMENT / PLAN:  (Z01.818) Preop general physical exam  (primary encounter diagnosis)  Comment: denies chest pain or shortness of breath. No old ekgs. Normal cmp/cbc/lipids last month  Plan: EKG 12-lead complete w/read - Clinics        Hold asa/nsaids/ETOH one week before.   Ok for low risk procedure. Excellent exercise tolerance    (J32.0) Chronic maxillary sinusitis  Plan: per ENT    (R94.31) Abnormal electrocardiogram  Comment: no old ekg available. No symptoms.   Plan: Echocardiogram Complete        Check echo    (R03.0) Elevated blood pressure reading without diagnosis of hypertension  Comment: a little nervous. White coat  Plan: Echocardiogram Complete        Continue self-monitor/exercise and lower sodium. Start norvasc if worse or echo finding. Chest pain or shortness of breath to er. Non-smoker and normal lipids. Call/email  with questions/concerns         Subjective     HPI related to upcoming procedure: sinus surgery      Preop Questions 11/8/2021   1. Have you ever had a heart attack or stroke? No   2. Have you ever had surgery on your heart or blood vessels, such as a stent placement, a coronary artery bypass, or surgery on an artery in your head, neck, heart, or legs? YES - Abrasion on leg    3. Do you have chest pain with activity? No   4. Do you have a history of  heart failure? No   5. Do you currently have a cold, bronchitis or symptoms of other infection? No   6. Do you have a cough, shortness of breath, or wheezing? No   7. Do you or anyone in your family have previous history of blood clots? No   8. Do you or does anyone in your family have a serious bleeding problem such as prolonged bleeding following surgeries or cuts? No   9. Have you ever had problems with anemia or been told to take iron pills? No   10. Have you had any abnormal blood loss such as black, tarry or bloody stools? No   11. Have you ever had a blood transfusion? No   12. Are you willing to have a blood transfusion if it is medically needed before, during, or after your surgery? Yes   13. Have you or any of your relatives ever had problems with anesthesia? No   14. Do you have sleep apnea, excessive snoring or daytime drowsiness? No   15. Do you have any artifical heart valves or other implanted medical devices like a pacemaker, defibrillator, or continuous glucose monitor? No   16. Do you have artificial joints? No   17. Are you allergic to latex? No     Health Care Directive:  Not interested    Preoperative Review of :   reviewed - no record of controlled substances prescribed.          Review of Systems  All other ROS negative    Patient Active Problem List    Diagnosis Date Noted     Chronic pansinusitis 10/15/2021     Priority: Medium     Added automatically from request for surgery [id number]       Nasal polyp 10/15/2021     Priority: Medium      Added automatically from request for surgery [id number]       Nasal septal deviation 10/15/2021     Priority: Medium     Added automatically from request for surgery [id number]       Nasal turbinate hypertrophy 10/15/2021     Priority: Medium     Added automatically from request for surgery [id number]       Nasal obstruction 10/15/2021     Priority: Medium     Added automatically from request for surgery [id number]        Past Medical History:   Diagnosis Date     ADD (attention deficit disorder)      Past Surgical History:   Procedure Laterality Date     KNEE SURGERY Left     ACL     VARICOCELECTOMY       Current Outpatient Medications   Medication Sig Dispense Refill     fluticasone (FLONASE) 50 MCG/ACT nasal spray Spray 1 spray into both nostrils daily       loratadine (CLARITIN) 10 MG tablet Take 10 mg by mouth daily (Patient not taking: Reported on 9/10/2021)       predniSONE (DELTASONE) 20 MG tablet Take 60 mg daily for 7 days, 40 mg daily for 2 days, 20 mg daily for 2 days, 10 mg daily for 2 days (Patient not taking: Reported on 10/15/2021) 41 tablet 0       No Known Allergies     Social History     Tobacco Use     Smoking status: Never Smoker     Smokeless tobacco: Current User     Types: Chew   Substance Use Topics     Alcohol use: Yes       History   Drug Use Unknown         Objective     BP (!) 141/81   Pulse 68   Temp 97  F (36.1  C) (Tympanic)   Wt 91.6 kg (202 lb)   SpO2 97%   BMI 29.40 kg/m     Physical Exam  GENERAL APPEARANCE: healthy, alert and no distress  EYES: Eyes grossly normal to inspection, PERRL and conjunctivae and sclerae normal  HENT: ear canals and TM's normal and nose and mouth without ulcers or lesions  NECK: no adenopathy, no asymmetry, masses, or scars and thyroid normal to palpation  RESP: lungs clear to auscultation - no rales, rhonchi or wheezes  CV: regular rate and rhythm, normal S1 S2, no S3 or S4 and no murmur, click or rub   ABDOMEN: soft, nontender, no HSM or masses and bowel  sounds normal  MS: extremities normal- no gross deformities noted  NEURO: Normal strength and tone, sensory exam grossly normal, mentation intact and speech normal  PSYCH: mentation appears normal and affect normal/bright    Recent Labs   Lab Test 09/10/21  1017   HGB 15.8         POTASSIUM 4.6   CR 1.01        Diagnostics:  No labs were ordered during this visit.   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       Hemoptysis  Hemoptysis is coughing up blood. It can be mild or serious. With mild hemoptysis, you may cough up blood-streaked saliva and mucus (sputum) when you have an infection in your nose, throat, or lungs (respiratory tract). Coughing up 1–2 cups (240–480 mL) of blood within 24 hours (massive hemoptysis) is a medical emergency.    The most common cause of hemoptysis is a respiratory tract infection, such as bronchitis or pneumonia. Other common causes include:    A lung tumor or upper airway tumor.  A medical condition that damages the large air passageways (bronchiectasis).  A blood clot in the lungs (pulmonary embolism).  A medical condition that keeps your blood from clotting normally.  Breathing in (inhaling) a small foreign object.    ImageSometimes the cause is not known. Hemoptysis can be a sign of a minor or serious medical condition, so it is important to see your health care provider.    Follow these instructions at home:  Watch your condition for any changes.  Take over-the-counter and prescription medicines only as told by your health care provider.  If you were prescribed an antibiotic medicine, take it as told by your health care provider. Do not stop taking the antibiotic even if you start to feel better.  Return to your normal activities as told by your health care provider. Ask your health care provider what activities are safe for you.  Do not use any products that contain nicotine or tobacco, such as cigarettes and e-cigarettes. If you need help quitting, ask your health care provider.  Keep all follow-up visits as told by your health care provider. This is important.  Contact a health care provider if:  You have a fever.  You cough up blood-streaked (blood-tinged) sputum.  Get help right away if:  You cough up fresh blood or blood clots.  You have trouble breathing.  You have chest pain.  This information is not intended to replace advice given to you by your health care provider. Make sure you discuss any questions you have with your health care provider.

## 2021-11-11 ENCOUNTER — LAB (OUTPATIENT)
Dept: LAB | Facility: CLINIC | Age: 51
End: 2021-11-11
Payer: COMMERCIAL

## 2021-11-11 DIAGNOSIS — Z11.59 ENCOUNTER FOR SCREENING FOR OTHER VIRAL DISEASES: ICD-10-CM

## 2021-11-11 PROCEDURE — U0003 INFECTIOUS AGENT DETECTION BY NUCLEIC ACID (DNA OR RNA); SEVERE ACUTE RESPIRATORY SYNDROME CORONAVIRUS 2 (SARS-COV-2) (CORONAVIRUS DISEASE [COVID-19]), AMPLIFIED PROBE TECHNIQUE, MAKING USE OF HIGH THROUGHPUT TECHNOLOGIES AS DESCRIBED BY CMS-2020-01-R: HCPCS

## 2021-11-11 PROCEDURE — U0005 INFEC AGEN DETEC AMPLI PROBE: HCPCS

## 2021-11-12 ENCOUNTER — ANESTHESIA EVENT (OUTPATIENT)
Dept: SURGERY | Facility: AMBULATORY SURGERY CENTER | Age: 51
End: 2021-11-12
Payer: COMMERCIAL

## 2021-11-12 LAB — SARS-COV-2 RNA RESP QL NAA+PROBE: NEGATIVE

## 2021-11-15 ENCOUNTER — HOSPITAL ENCOUNTER (OUTPATIENT)
Facility: AMBULATORY SURGERY CENTER | Age: 51
End: 2021-11-15
Attending: OTOLARYNGOLOGY | Admitting: OTOLARYNGOLOGY
Payer: COMMERCIAL

## 2021-11-15 ENCOUNTER — ANESTHESIA (OUTPATIENT)
Dept: SURGERY | Facility: AMBULATORY SURGERY CENTER | Age: 51
End: 2021-11-15
Payer: COMMERCIAL

## 2021-11-15 VITALS
TEMPERATURE: 97.9 F | WEIGHT: 202 LBS | HEART RATE: 84 BPM | BODY MASS INDEX: 29.4 KG/M2 | DIASTOLIC BLOOD PRESSURE: 68 MMHG | RESPIRATION RATE: 19 BRPM | OXYGEN SATURATION: 96 % | SYSTOLIC BLOOD PRESSURE: 119 MMHG

## 2021-11-15 DIAGNOSIS — J34.3 NASAL TURBINATE HYPERTROPHY: ICD-10-CM

## 2021-11-15 DIAGNOSIS — J33.9 NASAL POLYP: ICD-10-CM

## 2021-11-15 DIAGNOSIS — J32.4 CHRONIC PANSINUSITIS: ICD-10-CM

## 2021-11-15 DIAGNOSIS — J34.2 NASAL SEPTAL DEVIATION: ICD-10-CM

## 2021-11-15 DIAGNOSIS — J34.89 NASAL OBSTRUCTION: ICD-10-CM

## 2021-11-15 PROCEDURE — 30520 REPAIR OF NASAL SEPTUM: CPT | Performed by: OTOLARYNGOLOGY

## 2021-11-15 PROCEDURE — 61782 SCAN PROC CRANIAL EXTRA: CPT | Performed by: OTOLARYNGOLOGY

## 2021-11-15 PROCEDURE — 31276 NSL/SINS NDSC FRNT TISS RMVL: CPT | Mod: LT

## 2021-11-15 PROCEDURE — 30930 THER FX NASAL INF TURBINATE: CPT | Performed by: OTOLARYNGOLOGY

## 2021-11-15 PROCEDURE — 61782 SCAN PROC CRANIAL EXTRA: CPT

## 2021-11-15 PROCEDURE — 31267 ENDOSCOPY MAXILLARY SINUS: CPT | Mod: 50 | Performed by: OTOLARYNGOLOGY

## 2021-11-15 PROCEDURE — G8907 PT DOC NO EVENTS ON DISCHARG: HCPCS

## 2021-11-15 PROCEDURE — G8916 PT W IV AB GIVEN ON TIME: HCPCS

## 2021-11-15 PROCEDURE — 31259 NSL/SINS NDSC SPHN TISS RMVL: CPT | Mod: 50 | Performed by: OTOLARYNGOLOGY

## 2021-11-15 PROCEDURE — 30520 REPAIR OF NASAL SEPTUM: CPT

## 2021-11-15 PROCEDURE — 31276 NSL/SINS NDSC FRNT TISS RMVL: CPT | Mod: 50 | Performed by: OTOLARYNGOLOGY

## 2021-11-15 PROCEDURE — 31259 NSL/SINS NDSC SPHN TISS RMVL: CPT | Mod: RT

## 2021-11-15 PROCEDURE — 31267 ENDOSCOPY MAXILLARY SINUS: CPT | Mod: RT

## 2021-11-15 PROCEDURE — 30930 THER FX NASAL INF TURBINATE: CPT

## 2021-11-15 RX ORDER — OXYMETAZOLINE HYDROCHLORIDE 0.05 G/100ML
SPRAY NASAL PRN
Status: DISCONTINUED | OUTPATIENT
Start: 2021-11-15 | End: 2021-11-15 | Stop reason: HOSPADM

## 2021-11-15 RX ORDER — DEXAMETHASONE SODIUM PHOSPHATE 4 MG/ML
INJECTION, SOLUTION INTRA-ARTICULAR; INTRALESIONAL; INTRAMUSCULAR; INTRAVENOUS; SOFT TISSUE PRN
Status: DISCONTINUED | OUTPATIENT
Start: 2021-11-15 | End: 2021-11-15

## 2021-11-15 RX ORDER — BACITRACIN ZINC 500 [USP'U]/G
OINTMENT TOPICAL PRN
Status: DISCONTINUED | OUTPATIENT
Start: 2021-11-15 | End: 2021-11-15 | Stop reason: HOSPADM

## 2021-11-15 RX ORDER — ONDANSETRON 4 MG/1
4 TABLET, ORALLY DISINTEGRATING ORAL EVERY 30 MIN PRN
Status: DISCONTINUED | OUTPATIENT
Start: 2021-11-15 | End: 2021-11-16 | Stop reason: HOSPADM

## 2021-11-15 RX ORDER — CEPHALEXIN 500 MG/1
500 CAPSULE ORAL 3 TIMES DAILY
Qty: 24 CAPSULE | Refills: 0 | Status: SHIPPED | OUTPATIENT
Start: 2021-11-15 | End: 2021-11-23

## 2021-11-15 RX ORDER — PROPOFOL 10 MG/ML
INJECTION, EMULSION INTRAVENOUS CONTINUOUS PRN
Status: DISCONTINUED | OUTPATIENT
Start: 2021-11-15 | End: 2021-11-15

## 2021-11-15 RX ORDER — PHENYLEPHRINE HYDROCHLORIDE 10 MG/ML
INJECTION INTRAVENOUS PRN
Status: DISCONTINUED | OUTPATIENT
Start: 2021-11-15 | End: 2021-11-15

## 2021-11-15 RX ORDER — COCAINE HYDROCHLORIDE 40 MG/ML
SOLUTION NASAL PRN
Status: DISCONTINUED | OUTPATIENT
Start: 2021-11-15 | End: 2021-11-15 | Stop reason: HOSPADM

## 2021-11-15 RX ORDER — OXYCODONE HYDROCHLORIDE 5 MG/1
5 TABLET ORAL EVERY 4 HOURS PRN
Status: DISCONTINUED | OUTPATIENT
Start: 2021-11-15 | End: 2021-11-16 | Stop reason: HOSPADM

## 2021-11-15 RX ORDER — SODIUM CHLORIDE, SODIUM LACTATE, POTASSIUM CHLORIDE, CALCIUM CHLORIDE 600; 310; 30; 20 MG/100ML; MG/100ML; MG/100ML; MG/100ML
INJECTION, SOLUTION INTRAVENOUS CONTINUOUS
Status: DISCONTINUED | OUTPATIENT
Start: 2021-11-15 | End: 2021-11-16 | Stop reason: HOSPADM

## 2021-11-15 RX ORDER — FENTANYL CITRATE 50 UG/ML
25 INJECTION, SOLUTION INTRAMUSCULAR; INTRAVENOUS
Status: DISCONTINUED | OUTPATIENT
Start: 2021-11-15 | End: 2021-11-16 | Stop reason: HOSPADM

## 2021-11-15 RX ORDER — MEPERIDINE HYDROCHLORIDE 25 MG/ML
12.5 INJECTION INTRAMUSCULAR; INTRAVENOUS; SUBCUTANEOUS
Status: DISCONTINUED | OUTPATIENT
Start: 2021-11-15 | End: 2021-11-16 | Stop reason: HOSPADM

## 2021-11-15 RX ORDER — HYDROCODONE BITARTRATE AND ACETAMINOPHEN 5; 325 MG/1; MG/1
1-2 TABLET ORAL EVERY 6 HOURS PRN
Qty: 15 TABLET | Refills: 0 | Status: SHIPPED | OUTPATIENT
Start: 2021-11-15 | End: 2022-02-15

## 2021-11-15 RX ORDER — CIPROFLOXACIN AND DEXAMETHASONE 3; 1 MG/ML; MG/ML
SUSPENSION/ DROPS AURICULAR (OTIC) PRN
Status: DISCONTINUED | OUTPATIENT
Start: 2021-11-15 | End: 2021-11-15 | Stop reason: HOSPADM

## 2021-11-15 RX ORDER — CEFAZOLIN SODIUM 2 G/100ML
2 INJECTION, SOLUTION INTRAVENOUS SEE ADMIN INSTRUCTIONS
Status: DISCONTINUED | OUTPATIENT
Start: 2021-11-15 | End: 2021-11-16 | Stop reason: HOSPADM

## 2021-11-15 RX ORDER — LIDOCAINE HYDROCHLORIDE AND EPINEPHRINE 10; 10 MG/ML; UG/ML
INJECTION, SOLUTION INFILTRATION; PERINEURAL PRN
Status: DISCONTINUED | OUTPATIENT
Start: 2021-11-15 | End: 2021-11-15 | Stop reason: HOSPADM

## 2021-11-15 RX ORDER — LIDOCAINE 40 MG/G
CREAM TOPICAL
Status: DISCONTINUED | OUTPATIENT
Start: 2021-11-15 | End: 2021-11-16 | Stop reason: HOSPADM

## 2021-11-15 RX ORDER — ACETAMINOPHEN 325 MG/1
975 TABLET ORAL ONCE
Status: COMPLETED | OUTPATIENT
Start: 2021-11-15 | End: 2021-11-15

## 2021-11-15 RX ORDER — CEFAZOLIN SODIUM 2 G/100ML
2 INJECTION, SOLUTION INTRAVENOUS
Status: COMPLETED | OUTPATIENT
Start: 2021-11-15 | End: 2021-11-15

## 2021-11-15 RX ORDER — ONDANSETRON 2 MG/ML
4 INJECTION INTRAMUSCULAR; INTRAVENOUS EVERY 30 MIN PRN
Status: DISCONTINUED | OUTPATIENT
Start: 2021-11-15 | End: 2021-11-16 | Stop reason: HOSPADM

## 2021-11-15 RX ORDER — LIDOCAINE HYDROCHLORIDE 20 MG/ML
INJECTION, SOLUTION INFILTRATION; PERINEURAL PRN
Status: DISCONTINUED | OUTPATIENT
Start: 2021-11-15 | End: 2021-11-15

## 2021-11-15 RX ORDER — FENTANYL CITRATE 50 UG/ML
25 INJECTION, SOLUTION INTRAMUSCULAR; INTRAVENOUS EVERY 5 MIN PRN
Status: DISCONTINUED | OUTPATIENT
Start: 2021-11-15 | End: 2021-11-16 | Stop reason: HOSPADM

## 2021-11-15 RX ORDER — FENTANYL CITRATE 50 UG/ML
INJECTION, SOLUTION INTRAMUSCULAR; INTRAVENOUS PRN
Status: DISCONTINUED | OUTPATIENT
Start: 2021-11-15 | End: 2021-11-15

## 2021-11-15 RX ORDER — PROPOFOL 10 MG/ML
INJECTION, EMULSION INTRAVENOUS PRN
Status: DISCONTINUED | OUTPATIENT
Start: 2021-11-15 | End: 2021-11-15

## 2021-11-15 RX ORDER — DEXAMETHASONE SODIUM PHOSPHATE 10 MG/ML
10 INJECTION, SOLUTION INTRAMUSCULAR; INTRAVENOUS ONCE
Status: DISCONTINUED | OUTPATIENT
Start: 2021-11-15 | End: 2021-11-16 | Stop reason: HOSPADM

## 2021-11-15 RX ADMIN — OXYCODONE HYDROCHLORIDE 5 MG: 5 TABLET ORAL at 11:22

## 2021-11-15 RX ADMIN — ONDANSETRON 4 MG: 2 INJECTION INTRAMUSCULAR; INTRAVENOUS at 11:00

## 2021-11-15 RX ADMIN — PHENYLEPHRINE HYDROCHLORIDE 100 MCG: 10 INJECTION INTRAVENOUS at 07:54

## 2021-11-15 RX ADMIN — CEFAZOLIN SODIUM 2 G: 2 INJECTION, SOLUTION INTRAVENOUS at 07:42

## 2021-11-15 RX ADMIN — PROPOFOL 200 MG: 10 INJECTION, EMULSION INTRAVENOUS at 07:44

## 2021-11-15 RX ADMIN — SODIUM CHLORIDE, SODIUM LACTATE, POTASSIUM CHLORIDE, CALCIUM CHLORIDE: 600; 310; 30; 20 INJECTION, SOLUTION INTRAVENOUS at 07:42

## 2021-11-15 RX ADMIN — ACETAMINOPHEN 975 MG: 325 TABLET ORAL at 07:08

## 2021-11-15 RX ADMIN — PROPOFOL 200 MCG/KG/MIN: 10 INJECTION, EMULSION INTRAVENOUS at 07:44

## 2021-11-15 RX ADMIN — LIDOCAINE HYDROCHLORIDE 60 MG: 20 INJECTION, SOLUTION INFILTRATION; PERINEURAL at 07:44

## 2021-11-15 RX ADMIN — Medication 0.2 MCG/KG/MIN: at 07:54

## 2021-11-15 RX ADMIN — Medication 50 MG: at 07:44

## 2021-11-15 RX ADMIN — FENTANYL CITRATE 50 MCG: 50 INJECTION, SOLUTION INTRAMUSCULAR; INTRAVENOUS at 07:44

## 2021-11-15 RX ADMIN — DEXAMETHASONE SODIUM PHOSPHATE 10 MG: 4 INJECTION, SOLUTION INTRA-ARTICULAR; INTRALESIONAL; INTRAMUSCULAR; INTRAVENOUS; SOFT TISSUE at 07:51

## 2021-11-15 NOTE — ANESTHESIA POSTPROCEDURE EVALUATION
Patient: Cleveland Maciel    Procedure: Procedure(s):  SINUS SURGERY, ENDOSCOPIC, USING OPTICAL TRACKING SYSTEM, Total Ethmoidectomy Bilateral Maxillary Antrostomies, bilateral spheniodotomy, Bilateral frontal sinusotomy  septoplasty; bilateral inferior turbinate reduction       Diagnosis:Chronic pansinusitis [J32.4]  Nasal polyp [J33.9]  Nasal septal deviation [J34.2]  Nasal turbinate hypertrophy [J34.3]  Nasal obstruction [J34.89]  Diagnosis Additional Information: No value filed.    Anesthesia Type:  General    Note:  Disposition: Outpatient   Postop Pain Control: Uneventful            Sign Out: Well controlled pain   PONV: No   Neuro/Psych: Uneventful            Sign Out: Acceptable/Baseline neuro status   Airway/Respiratory: Uneventful            Sign Out: Acceptable/Baseline resp. status   CV/Hemodynamics: Uneventful            Sign Out: Acceptable CV status   Other NRE: NONE   DID A NON-ROUTINE EVENT OCCUR? No           Last vitals:  Vitals Value Taken Time   /70 11/15/21 1130   Temp 36.6  C (97.9  F) 11/15/21 1130   Pulse 92 11/15/21 1130   Resp 18 11/15/21 1130   SpO2 94 % 11/15/21 1130       Electronically Signed By: Sincere Connor MD  November 15, 2021  4:06 PM

## 2021-11-15 NOTE — ANESTHESIA CARE TRANSFER NOTE
Patient: Cleveland Maciel    Procedure: Procedure(s):  SINUS SURGERY, ENDOSCOPIC, USING OPTICAL TRACKING SYSTEM, Total Ethmoidectomy Bilateral Maxillary Antrostomies, bilateral spheniodotomy, Bilateral frontal sinusotomy  septoplasty; bilateral inferior turbinate reduction       Diagnosis: Chronic pansinusitis [J32.4]  Nasal polyp [J33.9]  Nasal septal deviation [J34.2]  Nasal turbinate hypertrophy [J34.3]  Nasal obstruction [J34.89]  Diagnosis Additional Information: No value filed.    Anesthesia Type:   General     Note:    Oropharynx: oropharynx clear of all foreign objects  Level of Consciousness: drowsy  Oxygen Supplementation: face mask    Independent Airway: airway patency satisfactory and stable  Dentition: dentition unchanged  Vital Signs Stable: post-procedure vital signs reviewed and stable  Report to RN Given: handoff report given  Patient transferred to: PACU    Handoff Report: Identifed the Patient, Identified the Reponsible Provider, Reviewed the pertinent medical history, Discussed the surgical course, Reviewed Intra-OP anesthesia mangement and issues during anesthesia, Set expectations for post-procedure period and Allowed opportunity for questions and acknowledgement of understanding      Vitals:  Vitals Value Taken Time   BP     Temp     Pulse 81 11/15/21 1110   Resp 18 11/15/21 1110   SpO2 98 % 11/15/21 1110   Vitals shown include unvalidated device data.    Electronically Signed By: SEBASTIÁN Loredo CRNA  November 15, 2021  11:11 AM

## 2021-11-15 NOTE — DISCHARGE INSTRUCTIONS
Instructions following Septoplasty and Nose Surgery    Recovery - Everyone recovers differently from a general anesthetic.  Symptoms such as fatigue, nausea, lightheadedness, and sometimes a low grade fever (up to 101 degrees) are not unusual.  As your body removes the anesthetic drugs from circulation, these symptoms will resolve.  Your nose will be sore after surgery, and you may even have symptoms similar to a sinus infection with headache, congestion, and pressure.  These will resolve with healing.  For several days you may experience bloody drainage from the nose, please use the drip pad as necessary for this. Call the office if the bleeding persists after 3 days or is perfuse. There are no diet restrictions after septoplasty, and you can resume your home medications except for blood thinners.  Please do not blow your nose for two weeks after surgery. You may gently dab your nose with Kleenex. Limit your activity to no strenuous activities until I see you for the first follow up visit, and sleep with your head elevated.    Medications - You were sent home with narcotic pain medication.  If you can tolerate the discomfort during your recovery by using just plain Tylenol or ibuprofen (advil), please do so.  However, do not hesitate to use the stronger pain medication if needed.  If you were sent home with an antibiotic, it is primarily used to help the healing process.  If it causes loose bowel movements or other signs of intolerance, it is appropriate to discontinue it.      Complications - Problems related to septoplasty almost always are detected during the operation, and special instruction will be given in that situation.  However, unexpected things can happen.  If you experience persistent bleeding, fevers, changes in vision, severe headache or nasal pain, this may be the sign of an infection.  Any of these symptoms should be called into my office or to the on call ENT if after hours. There may be small  plastic pieces placed inside your nose during surgery (splints). These help to promote the septum into healing in its straightened position, and will be removed at the follow up visit.    Follow up - Splints will be removed at the follow up visit. This is simple and takes just a few seconds afterwards, the improvement you will expereince in breathing from the septoplasty is usually dramatic and immediate.  I will then see you 4 to 6 weeks after that visit to make sure that everything has healed appropriately.    If there are any questions or issues with the above, or if there are other issues that concern you, always feel free to call the clinic and I am happy to speak with you as soon as I can.    Lopez Turner MD  Otolaryngology  [facility]  [phone] or [phone] After hours, [facility] option    [facility]   Adult Discharge Orders & Instructions     For 24 hours after surgery    1. Get plenty of rest.  A responsible adult must stay with you for at least 24 hours after you leave the hospital.   2. Do not drive or use heavy equipment.  If you have weakness or tingling, don't drive or use heavy equipment until this feeling goes away.  3. Do not drink alcohol.  4. Avoid strenuous or risky activities.  Ask for help when climbing stairs.   5. You may feel lightheaded.  IF so, sit for a few minutes before standing.  Have someone help you get up.   6. If you have nausea (feel sick to your stomach): Drink only clear liquids such as apple juice, ginger ale, broth or 7-Up.  Rest may also help.  Be sure to drink enough fluids.  Move to a regular diet as you feel able.  7. You may have a slight fever. Call the doctor if your fever is over 100 F (37.7 C) (taken under the tongue) or lasts longer than 24 hours.  8. You may have a dry mouth, a sore throat, muscle aches or trouble sleeping.  These should go away after 24 hours.  9. Do not make important or legal decisions.     Call  your doctor for any of the followin.  Signs of infection (fever, growing tenderness at the surgery site, a large amount of drainage or bleeding, severe pain, foul-smelling drainage, redness, swelling).    2. It has been over 8 to 10 hours since surgery and you are still not able to urinate (pass water).    3.  Headache for over 24 hours.    4.  Numbness, tingling or weakness the day after surgery (if you had spinal anesthesia).                  5. Signs of Covid-19 infection (temperature over 100 degrees, shortness of breath, cough, loss of taste/smell, generalized body aches, persistent headache,                  chills, sore throat, nausea/vomiting/diarrhea).

## 2021-11-15 NOTE — ANESTHESIA PROCEDURE NOTES
Airway       Patient location during procedure: OR       Procedure Start/Stop Times: 11/15/2021 7:48 AM  Staff -        Performed By: CRNAIndications and Patient Condition       Indications for airway management: nicola-procedural       Induction type:intravenous       Mask difficulty assessment: 1 - vent by mask    Final Airway Details       Final airway type: endotracheal airway       Successful airway: ETT - single and CHANO  Endotracheal Airway Details        ETT size (mm): 7.5       Cuffed: yes       Successful intubation technique: direct laryngoscopy       DL Blade Type: Donaldson 2       Grade View of Cords: 1       Adjucts: stylet and tooth guard       Position: Center    Post intubation assessment        Placement verified by: capnometry, equal breath sounds and chest rise        Number of attempts at approach: 1       Number of other approaches attempted: 0       Ease of procedure: easy       Dentition: Intact

## 2021-11-15 NOTE — ANESTHESIA PREPROCEDURE EVALUATION
Anesthesia Pre-Procedure Evaluation    Patient: Cleveland Maciel   MRN: [id number] : 1970        Preoperative Diagnosis: Chronic pansinusitis [J32.4]  Nasal polyp [J33.9]  Nasal septal deviation [J34.2]  Nasal turbinate hypertrophy [J34.3]  Nasal obstruction [J34.89]    Procedure : Procedure(s):  SINUS SURGERY, ENDOSCOPIC, USING OPTICAL TRACKING SYSTEM - all sinuses;  septoplasty; bilateral inferior turbinate reduction          Past Medical History:   Diagnosis Date     ADD (attention deficit disorder)       Past Surgical History:   Procedure Laterality Date     KNEE SURGERY Left     ACL     VARICOCELECTOMY        No Known Allergies   Social History     Tobacco Use     Smoking status: Never Smoker     Smokeless tobacco: Current User     Types: Chew   Substance Use Topics     Alcohol use: Yes      Wt Readings from Last 1 Encounters:   11/15/21 91.6 kg (202 lb)        Anesthesia Evaluation            ROS/MED HX  ENT/Pulmonary:  - neg pulmonary ROS     Neurologic:  - neg neurologic ROS     Cardiovascular:  - neg cardiovascular ROS     METS/Exercise Tolerance:     Hematologic:  - neg hematologic  ROS     Musculoskeletal:  - neg musculoskeletal ROS     GI/Hepatic:  - neg GI/hepatic ROS     Renal/Genitourinary:  - neg Renal ROS     Endo:  - neg endo ROS     Psychiatric/Substance Use:  - neg psychiatric ROS     Infectious Disease:  - neg infectious disease ROS     Malignancy:  - neg malignancy ROS     Other:  - neg other ROS          Physical Exam    Airway  airway exam normal      Mallampati: II   TM distance: > 3 FB   Neck ROM: full   Mouth opening: > 3 cm    Respiratory Devices and Support         Dental  no notable dental history         Cardiovascular          Rhythm and rate: regular and normal     Pulmonary   pulmonary exam normal        breath sounds clear to auscultation           OUTSIDE LABS:  CBC:   Lab Results   Component Value Date    WBC 7.0 09/10/2021    HGB 15.8 09/10/2021    HCT 46.8  09/10/2021     09/10/2021     BMP:   Lab Results   Component Value Date     09/10/2021    POTASSIUM 4.6 09/10/2021    CHLORIDE 105 09/10/2021    CO2 30 09/10/2021    BUN 11 09/10/2021    CR 1.01 09/10/2021    GLC 91 09/10/2021     COAGS: No results found for: PTT, INR, FIBR  POC: No results found for: BGM, HCG, HCGS  HEPATIC:   Lab Results   Component Value Date    ALBUMIN 4.2 09/10/2021    PROTTOTAL 7.6 09/10/2021    ALT 28 09/10/2021    AST 19 09/10/2021    ALKPHOS 69 09/10/2021    BILITOTAL 1.0 09/10/2021     OTHER:   Lab Results   Component Value Date    PATRICIA 8.9 09/10/2021    TSH 1.28 09/10/2021       Anesthesia Plan    ASA Status:  1   NPO Status:  NPO Appropriate    Anesthesia Type: General.     - Airway: ETT   Induction: Intravenous.   Maintenance: Balanced.        Consents    Anesthesia Plan(s) and associated risks, benefits, and realistic alternatives discussed. Questions answered and patient/representative(s) expressed understanding.     - Discussed with:  Patient      - Extended Intubation/Ventilatory Support Discussed: No.      - Patient is DNR/DNI Status: No    Use of blood products discussed: No .     Postoperative Care    Pain management: Oral pain medications, IV analgesics, Multi-modal analgesia.   PONV prophylaxis: Dexamethasone or Solumedrol, Ondansetron (or other 5HT-3), Background Propofol Infusion     Comments:                Sincere Connor MD

## 2021-11-15 NOTE — OP NOTE
PREOPERATIVE DIAGNOSES:   1. Bilateral Nasal polyposis.   2. Chronic pansinusitis.   3. Nasal obstruction.     POSTOPERATIVE DIAGNOSES:   1. Bilateral Nasal polyposis.   2. Chronic pansinusitis.   3. Nasal obstruction.     PROCEDURES PERFORMED:   1. Three-dimensional image-guided computer-assisted sinus surgery.   2. Bilateral endoscopic removal of nasal polyposis.   3. Bilateral endoscopic maxillary antrostomy with tissue removal.   4. Bilateral endoscopic total ethmoidectomy.   5. Bilateral endoscopic frontal sinusotomy.   6. Bilateral endoscopic sphenoid sinusotomy with tissue removal  7. Septoplasty  8. Bilateral inferior turbinate out-fracture  SURGEON: Lopez Turner MD   BLOOD LOSS: 100 mL.   COMPLICATIONS: None.   SPECIMENS: None.   ANESTHESIA: GETA.   FINDINGS, GRAFTS, IMPLANTS: bilateral dissolvable nasopore, Mejia Splints  INDICATIONS: Cleveland Maciel presented to me with a long history of chronic nasal disease and chronic sinusitis.  On evaluation, the patient had significant nasal polyposis bilaterally, and CT scan confirmed pansinusitis. He failed maximal medical therapy recently. Therefore, my recommendation was for the above-named procedures. Preoperatively, risks discussed included the risks of infection, bleeding, the risks of general anesthesia, possible recurrence of polyp disease, possible injury to the eyes, base of skull and tear duct system, and possible alteration of sense of smell, although the patient has had a poor or no sense of smell for many years. I explained to him the sense of smell may never return since it has been so long. The patient understood these risks and possible outcomes and wished to proceed.   OPERATIVE PROCEDURE: After being taken to the operating room and induction of general endotracheal tube anesthesia, the bed was rotated 90 degrees. I attached the [employer] SL fusion electromagnetic reference strap to the head and the device was calibrated per 's  recommendations. A procedural pause was conductive to identify the patient by name, birthday, and procedure. After that, he was prepped and draped in the normal clean fashion. I began by applying topical anesthetic in the form of 2 cottonoids on each side of the nose which had been soaked with a total of 4 mL of 4% liquid cocaine.   The septum was deviated to the right. I took photos of this and polyps on both sides first. Next, I turned my attention to the left nasal cavity.  Once again, there were polyps growing from the middle meatus into the left nasal cavity. I injected directly into them with 1% lidocaine with 1:100,000 epinephrine. I used a spinal needle with local anesthetic to inject the posterior lateral wall at the sphenopalatine artery area, as well as the axilla of the middle turbinate and head of the middle turbinate. After this was done, I went in with the 0 degree endoscope and 12 degree shaver and continued to follow the polyps up into the left middle meatus, medial to the left middle turbinate. Eventually I cleaned it out and was able to see the uncinate process. I removed polyps in the sphenoethmoid recess as well. I used the rotating backbiter to trim away at the uncinate bone to expose the natural ostium of the left maxillary sinus. I used a 45 degree endoscope to visualize the lateral nasal wall and maxillary os. Once I identified it, I was able to use the sinus shaver to trim away at the remainder of the uncinate and open the left maxillary sinus by widen the os. There was polypoid mucosa and polyps within the maxillary sinus. I used the image guidance system to confirm the location of the medial and inferior walls of the orbit, and used a 90 degree sinus shaver into the left maxillary sinus to trim away the polyps and mucopurulence. After this was completely cleaned out, I switched back to the 12 degree shaver and a 0 degree endoscope. I took down the remnants of the face of the ethmoid bulla  and skeletonized the horizontal lamella and proceeded directly posteriorly through several layers of posterior ethmoid cells which also had polyps and mucopurulence. Eventually I reached the posterior most left ethmoid cell. I identified and confirmed that I had located the roof of the posterior ethmoid system and the natural sphenoid os with the image guidance system. I widened the sphenoid os with the shaver. There were polyps within the sphenoid. I entered the sinus with the shaver and removed these polyps. Photos were taken. I then dissected in a posterior to anterior direction with the 12 degree shaver, trimming away diseased mucosa and polyps as well as bony septations. I crossed the ethmoid infundibulum into the anterior ethmoid system and, using the shaver, trimmed away at the superiormost remnant of the uncinate bone to identify the agger nasi cell. Using a shaver, I trimmed away at the remainder of the floor of the agger nasi cells, which also had polyps. Using the image-guided curved sinus suction, I then palpated up into the natural ostium of the left frontal sinus. After I identified the location, I used the 12 degree shaver to trim away at several more anterior ethmoid air cells until I had exposed the left frontal ostium, which was also still filled with polyps and mucopurulence. This was debrided out and I opened up the frontal outflow tract and entered the frontal sinus. I used the 90 degree shaver within the left frontal outflow tract to open this up and remove the polyps. Photos were taken.   Next I performed septoplasty to open up the nose from nasal obstruction and to access the right middle meatus. I made a right hemitransfixion septoplasty incision in the right nasal vestibule in a traditional open fashion. I then dissected down onto the left side of the cartilaginous septum through the right-sided incision. I then was able to start a submucoperichondrial pocket directly on the left side of  "the cartilaginous septum. The patient had a deviated right maxillary crest as well as the cartilage deflected to the right off the maxillary crest behind the caudal deflection creating a large spur posteriorly impacting the right middle meatus. After I completely elevated the mucoperichondrium off the left side of the septum and the bony spur, I continued posterior raising a flap off the bone. I then made a chondrotomy incision through the cartilage in a \"C\" shape fashion approximately 1.5 cm back from the anterior edge and 1.5 cm from the dorsum. I broke over to the right side and raised a submucoperiosteal flap on the entire right side of the nasal septum. After this was done, I freed the cartilage from the bony septum, and I removed it in one large piece. It was brought to the back table and carved free of the defelection for later reinserted back into the mucoperichondrial pocket. I then used a minesh to remove the deflected maxillary crest on the right side. I also used a double-action scissors and removed a portion of the posterior bony septum by cutting superiorly leaving an adequate strut. Some of the bony septum was removed and discarded including the left bony spur. I placed the cartilage back in to the flaps. I laid the flaps back together and this significantly improved the nasal airway. I irrigated the septum in between the flaps. I then closed my septoplasty incision with 3 simple interrupted 4-0 chromic gut sutures.   Lastly I performed out-fracture by using the back end of the caudal to fracture both inferior turbinate bones laterally. I did this at multiple spots along each bone. I then used the longest nasal speculum to fracture the entire inferior turbinates laterally on both sides. The airway was now wide open on both sides.   I removed the cottonoids from the right side of the nose and entered the right nasal cavity with a 0 degree rigid endoscope. The nasal cavity had polyps in the middle " meatus, and also medial to the middle turbinate. He had a septal deviation to the right with a spur. Therefore, I injected 1% lidocaine with 1:100,000 epinephrine directly into the polyps and spur. I then entered with the spinal needle and 0 degree endoscope and injected the posterior lateral wall of the nasal cavity as well as the body of the right middle turbinate and the anterior insertion of the right middle turbinate. After I did this field block, using a 0 degree endoscope and a 12 degree shaver blade, I began removal of the polyposis. I trimmed away at the polyps with a shaver blade and slowly followed it back without traumatizing the mucosa into the right middle meatus. I used the sinus shaver and removed several more large polyps which were coming down from the sphenoethmoid recess obstructing the posterior nasal cavity, and polyps medial and anterior to the middle turbinate. After these polyps were removed, I continued into the right middle meatus. I used the rotating backbiter and trimmed away at the uncinate process to expose the natural ostium of the right maxillary sinus. I used a 45 degree rigid endoscope to visualize the right lateral nasal wall and maxillary sinus os. I entered the right maxillary sinus with a shaver blade, widening the natural os, and then removed polyps and mucopurulence and polypoid tissue from the right maxillary sinus. I used a 90 degree shaver to remove the polyps within the right maxillary sinus. After this was done, I then confirmed the position of the ethmoid bulla using the image guidance system. I switched back to the 12 degree shaver and a 0 degree endoscope and took down the face of the ethmoid bulla. Immediately, copious amounts of thick yellow mucopurulent oozed out and I suctioned this away. I skeletonized the horizontal lamella and proceeded directly posteriorly through several layers of posterior ethmoid air cells which and were filled with mucopurulent and polyps.  After reaching the face of the sphenoid sinus on the right side, I used the image guidance system to identify the natural sphenoid os. I then entered the right sphenoid sinus and opened the os with the shaver. I suctioned thick mucous out. There were polyps within the right sphenoid and I used the shaver within the sinus to remove these. I then dissected upward and, using the image guidance system, confirmed that I had found the posterior extent of the roof of the ethmoid. I then was able to dissect in a posterior to anterior direction, removing polyps and bony septations along the way. Eventually I crossed the ethmoid infundibulum into the anterior ethmoid system.   At this point, I used the shaver to trim away at the anterior insertion of the right middle turbinate to expose the agger nasi cells. I used the 12 degree shaver to remove several more anterior ethmoid air cells. Once again, there was minimal to no pneumatization. They were all filled with purulence and polyps. I then used the image-guided curved sinus seeker to palpate the natural ostium to the right frontal sinus. Using a 12-degree shaver blade and giraffe instruments, I trimmed away at several more anterior ethmoid air cells to break into the right frontal ostium. After this was done, I trimmed down more diseased tissue and polyps to open the right frontal sinus. I also used the 90 degree shaver within the frontal outflow tract to open this. There was thick and copious mucous within the sinus I suctioned out. At this point, I had completed the right-sided sinus surgery.   I inserted nasopore in each middle meatus to keep the middle turbinate medial, prevent scarring, and help with healing.  Lastly, I placed a Mejia stent on each side and stitched them together with a 3-0 nylon suture.  At this point, the entire procedure was now complete. I reinspected both sides of the nose and there was good hemostasis with a greatly improved airway bilaterally.   All instruments were accounted for and all counts were correct. The patient's bed was rotated 90 degrees back to the care of anesthesia. They were awakened, extubated and sent to the recovery room in good condition.

## 2021-11-21 NOTE — PROGRESS NOTES
From: Sameer Frias  To: Florentin Burnett MD  Sent: 2/8/2018 10:56 AM CST  Subject: Pain pills    I recently came in for doctors appointment. 1/6/18  I was given a prescription for pain pills that will be gone on 1/10/18  Is it possible to get a refill?  Thanks Yovani   HPI - Cleveland Maciel is a 51 year old male who is here for their first postoperative visit, status post endoscopic sinus surgery on 10/15/21.  They report the expected amount of congestion, facial pressure, and mild bloody drainage.  No changes in vision, no fevers or chills.  Nasal saline rinses and oral antibiotics have been tolerated.    Past Medical History:   Diagnosis Date     ADD (attention deficit disorder)        Physical Exam  General - The patient is in no distress.  Alert and oriented to person and place, answers questions and cooperates with examination appropriately.   Eyes - Extraocular movements intact.  Sclera were not icteric or injected, conjunctiva were pink and moist.  Neuro - CN 2-12 grossly intact. HB 1 out of 6.   Nose - I first removed the Mejia splints by cutting the nylon suture removing this.  I slid the splints out.  The septum was midline without perforation or hematoma.  Both inferior turbinates were well lateralized, and the airway was wide open.  There was dissolving nasal packing and old bloody mucus in each middle meatus.  Please see below for debridement.    PROCEDURE - ENDOSCOPIC SINUS DEBRIDEMENT    Nasal exam performed with a zero degree rigid endoscope for purposes of endoscopic sinus debridement. I began by spraying both sides with lidocaine and neosynephrine.  Color photographs were taken for the medical record.  I began on the right side.  The middle meatus was noted to obstructed with a dark crust, this was gently dislodged from the lateral wall with a number 9 suction, I was then able to visualize the right maxillary sinusotomy, it is healing well and open.  No purulence noted.  I then moved further back, and debrided some dark crusts and thick dark mucous away from the ethmoid roof.  The roof was then visualized and is healing well.  I turned my attention to the left side.  Once again some dark crust was dislodged from the middle meatus and removed from the nose.   I was then able to pass the scope into the left middle meatus.  The maxillary sinusotomy is healing well, no abnormal secretions noted.  Further back, I debrided some crusts from the lateral wall and roof of the ethmoid system.  I was then able to visualize a nicely remucosalizing surface.      A/P - Cleveland Maciel is a 51 year old male is doing well from sinus surgery (all sinuses) and septoplasty and turbinate reduction.  There are no signs of complications or infection.  Patient instructed to continue saline rinses 2-3 times daily.  I will see them in 2 weeks for possible additional endoscopically assisted debridement of the sinuses.

## 2021-11-23 ENCOUNTER — OFFICE VISIT (OUTPATIENT)
Dept: OTOLARYNGOLOGY | Facility: CLINIC | Age: 51
End: 2021-11-23
Payer: COMMERCIAL

## 2021-11-23 VITALS
DIASTOLIC BLOOD PRESSURE: 82 MMHG | WEIGHT: 196 LBS | SYSTOLIC BLOOD PRESSURE: 139 MMHG | HEART RATE: 89 BPM | BODY MASS INDEX: 28.53 KG/M2

## 2021-11-23 DIAGNOSIS — Z98.890 S/P FESS (FUNCTIONAL ENDOSCOPIC SINUS SURGERY): Primary | ICD-10-CM

## 2021-11-23 PROCEDURE — 31237 NSL/SINS NDSC SURG BX POLYPC: CPT | Mod: 50 | Performed by: OTOLARYNGOLOGY

## 2021-11-23 PROCEDURE — 99024 POSTOP FOLLOW-UP VISIT: CPT | Performed by: OTOLARYNGOLOGY

## 2021-11-23 NOTE — LETTER
11/23/2021         RE: Cleveland Maciel  [id number] [facility] [address]        Dear Colleague,    Thank you for referring your patient, Cleveland Maciel, to the [facility]. Please see a copy of my visit note below.    HPI - Cleveland Maciel is a 51 year old male who is here for their first postoperative visit, status post endoscopic sinus surgery on 10/15/21.  They report the expected amount of congestion, facial pressure, and mild bloody drainage.  No changes in vision, no fevers or chills.  Nasal saline rinses and oral antibiotics have been tolerated.    Past Medical History:   Diagnosis Date     ADD (attention deficit disorder)        Physical Exam  General - The patient is in no distress.  Alert and oriented to person and place, answers questions and cooperates with examination appropriately.   Eyes - Extraocular movements intact.  Sclera were not icteric or injected, conjunctiva were pink and moist.  Neuro - CN 2-12 grossly intact. HB 1 out of 6.   Nose - I first removed the Mejia splints by cutting the nylon suture removing this.  I slid the splints out.  The septum was midline without perforation or hematoma.  Both inferior turbinates were well lateralized, and the airway was wide open.  There was dissolving nasal packing and old bloody mucus in each middle meatus.  Please see below for debridement.    PROCEDURE - ENDOSCOPIC SINUS DEBRIDEMENT    Nasal exam performed with a zero degree rigid endoscope for purposes of endoscopic sinus debridement. I began by spraying both sides with lidocaine and neosynephrine.  Color photographs were taken for the medical record.  I began on the right side.  The middle meatus was noted to obstructed with a dark crust, this was gently dislodged from the lateral wall with a number 9 suction, I was then able to visualize the right maxillary sinusotomy, it is healing well and open.  No purulence noted.  I then moved further back, and  debrided some dark crusts and thick dark mucous away from the ethmoid roof.  The roof was then visualized and is healing well.  I turned my attention to the left side.  Once again some dark crust was dislodged from the middle meatus and removed from the nose.  I was then able to pass the scope into the left middle meatus.  The maxillary sinusotomy is healing well, no abnormal secretions noted.  Further back, I debrided some crusts from the lateral wall and roof of the ethmoid system.  I was then able to visualize a nicely remucosalizing surface.      A/P - Cleveland Maciel is a 51 year old male is doing well from sinus surgery (all sinuses) and septoplasty and turbinate reduction.  There are no signs of complications or infection.  Patient instructed to continue saline rinses 2-3 times daily.  I will see them in 2 weeks for possible additional endoscopically assisted debridement of the sinuses.         Again, thank you for allowing me to participate in the care of your patient.        Sincerely,        Lopez Turner MD

## 2021-12-02 NOTE — PROGRESS NOTES
HPI - Cleveland Maciel is a 51 year old male who is here for his second postoperative visit, status post endoscopic sinus surgery on 10/15/21.  Nasal saline rinses and oral antibiotics have been tolerated. He can breath much better. Sense of smell still isn't very good.    Past Medical History:   Diagnosis Date     ADD (attention deficit disorder)        Physical Exam  General - The patient is in no distress.  Alert and oriented to person and place, answers questions and cooperates with examination appropriately.   Eyes - Extraocular movements intact.  Sclera were not icteric or injected, conjunctiva were pink and moist.  Neuro - CN 2-12 grossly intact. HB 1 out of 6.   Nose - anterior rhinoscopey shows the septum was midline without perforation or hematoma.  Both inferior turbinates were well lateralized, and the airway was wide open. He has some old blood crust in each middle meatus.     PROCEDURE - ENDOSCOPIC SINUS DEBRIDEMENT     Nasal exam performed with a 30 degree rigid endoscope for purposes of endoscopic sinus debridement. I began by spraying both sides with lidocaine and neosynephrine.  Color photographs were taken for the medical record.  I began on the right side.  The middle meatus was noted to obstructed with a dark crust, this was gently dislodged from the middle meatus with a number 9 suction, I was then able to visualize the right maxillary sinusotomy, it is healing well and open.  No purulence noted. I also used a curved suction to get in there. I then moved further back, and debrided some dark crusts and thick dark mucous away from the ethmoid roof. I slipped the curve suction into the right frontal outflow tract and this was patent. The roof was then visualized and is healing well.  I turned my attention to the left side.  Once again some dark crust was dislodged from the middle meatus and removed from the nose with a curved suction.  I was then able to pass the scope into the left middle  meatus.  The maxillary sinusotomy is healing well, no abnormal secretions noted.  Further back, I debrided some crusts from the lateral wall and roof of the ethmoid system and up into the outflow tract. I was then able to visualize a nicely remucosalizing surface.      A/P - Cleveland Maciel is a 51 year old male is doing well from sinus surgery (all sinuses) and septoplasty and turbinate reduction for chronic rhinosinusitis with nasal polyps. There are no signs of complications or infection. Patient instructed to continue saline rinses daily, and add flonase. I may prescribe prednisone in one month if his smell isn't better. He can mychart for this. Return in 6 months.

## 2021-12-03 ENCOUNTER — OFFICE VISIT (OUTPATIENT)
Dept: OTOLARYNGOLOGY | Facility: CLINIC | Age: 51
End: 2021-12-03
Payer: COMMERCIAL

## 2021-12-03 VITALS
SYSTOLIC BLOOD PRESSURE: 136 MMHG | HEART RATE: 75 BPM | DIASTOLIC BLOOD PRESSURE: 79 MMHG | OXYGEN SATURATION: 98 % | RESPIRATION RATE: 16 BRPM

## 2021-12-03 DIAGNOSIS — J32.4 CHRONIC PANSINUSITIS: ICD-10-CM

## 2021-12-03 DIAGNOSIS — Z98.890 S/P FESS (FUNCTIONAL ENDOSCOPIC SINUS SURGERY): Primary | ICD-10-CM

## 2021-12-03 PROCEDURE — 31237 NSL/SINS NDSC SURG BX POLYPC: CPT | Mod: 50 | Performed by: OTOLARYNGOLOGY

## 2021-12-03 PROCEDURE — 99024 POSTOP FOLLOW-UP VISIT: CPT | Performed by: OTOLARYNGOLOGY

## 2021-12-03 RX ORDER — FLUTICASONE PROPIONATE 50 MCG
2 SPRAY, SUSPENSION (ML) NASAL DAILY
Qty: 16 G | Refills: 11 | Status: SHIPPED | OUTPATIENT
Start: 2021-12-03

## 2021-12-03 NOTE — LETTER
12/3/2021         RE: Cleveland Maciel  [id number] [facility] [address]        Dear Colleague,    Thank you for referring your patient, Cleveland Maciel, to the [facility]. Please see a copy of my visit note below.    HPI - Cleveland Maciel is a 51 year old male who is here for his second postoperative visit, status post endoscopic sinus surgery on 10/15/21.  Nasal saline rinses and oral antibiotics have been tolerated. He can breath much better. Sense of smell still isn't very good.    Past Medical History:   Diagnosis Date     ADD (attention deficit disorder)        Physical Exam  General - The patient is in no distress.  Alert and oriented to person and place, answers questions and cooperates with examination appropriately.   Eyes - Extraocular movements intact.  Sclera were not icteric or injected, conjunctiva were pink and moist.  Neuro - CN 2-12 grossly intact. HB 1 out of 6.   Nose - anterior rhinoscopey shows the septum was midline without perforation or hematoma.  Both inferior turbinates were well lateralized, and the airway was wide open. He has some old blood crust in each middle meatus.     PROCEDURE - ENDOSCOPIC SINUS DEBRIDEMENT     Nasal exam performed with a 30 degree rigid endoscope for purposes of endoscopic sinus debridement. I began by spraying both sides with lidocaine and neosynephrine.  Color photographs were taken for the medical record.  I began on the right side.  The middle meatus was noted to obstructed with a dark crust, this was gently dislodged from the middle meatus with a number 9 suction, I was then able to visualize the right maxillary sinusotomy, it is healing well and open.  No purulence noted. I also used a curved suction to get in there. I then moved further back, and debrided some dark crusts and thick dark mucous away from the ethmoid roof. I slipped the curve suction into the right frontal outflow tract and this was patent. The  roof was then visualized and is healing well.  I turned my attention to the left side.  Once again some dark crust was dislodged from the middle meatus and removed from the nose with a curved suction.  I was then able to pass the scope into the left middle meatus.  The maxillary sinusotomy is healing well, no abnormal secretions noted.  Further back, I debrided some crusts from the lateral wall and roof of the ethmoid system and up into the outflow tract. I was then able to visualize a nicely remucosalizing surface.      A/P - Cleveland Jarod Maciel is a 51 year old male is doing well from sinus surgery (all sinuses) and septoplasty and turbinate reduction for chronic rhinosinusitis with nasal polyps. There are no signs of complications or infection. Patient instructed to continue saline rinses daily, and add flonase. I may prescribe prednisone in one month if his smell isn't better. He can mychart for this. Return in 6 months.         Again, thank you for allowing me to participate in the care of your patient.        Sincerely,        Lopez Turner MD

## 2021-12-07 ENCOUNTER — ANCILLARY PROCEDURE (OUTPATIENT)
Dept: CARDIOLOGY | Facility: CLINIC | Age: 51
End: 2021-12-07
Attending: FAMILY MEDICINE
Payer: COMMERCIAL

## 2021-12-07 DIAGNOSIS — R94.31 ABNORMAL ELECTROCARDIOGRAM: ICD-10-CM

## 2021-12-07 DIAGNOSIS — R03.0 ELEVATED BLOOD PRESSURE READING WITHOUT DIAGNOSIS OF HYPERTENSION: ICD-10-CM

## 2021-12-07 LAB — LVEF ECHO: NORMAL

## 2021-12-07 PROCEDURE — 93306 TTE W/DOPPLER COMPLETE: CPT | Performed by: INTERNAL MEDICINE

## 2022-01-11 ENCOUNTER — TELEPHONE (OUTPATIENT)
Dept: SURGERY | Facility: CLINIC | Age: 52
End: 2022-01-11

## 2022-01-11 ENCOUNTER — OFFICE VISIT (OUTPATIENT)
Dept: SURGERY | Facility: CLINIC | Age: 52
End: 2022-01-11
Payer: COMMERCIAL

## 2022-01-11 VITALS
BODY MASS INDEX: 29.55 KG/M2 | SYSTOLIC BLOOD PRESSURE: 147 MMHG | WEIGHT: 203 LBS | DIASTOLIC BLOOD PRESSURE: 80 MMHG | HEART RATE: 69 BPM

## 2022-01-11 DIAGNOSIS — K64.4 EXTERNAL HEMORRHOIDS: ICD-10-CM

## 2022-01-11 DIAGNOSIS — Z12.11 SPECIAL SCREENING FOR MALIGNANT NEOPLASMS, COLON: Primary | ICD-10-CM

## 2022-01-11 PROCEDURE — 99203 OFFICE O/P NEW LOW 30 MIN: CPT | Mod: 25 | Performed by: SURGERY

## 2022-01-11 PROCEDURE — 46600 DIAGNOSTIC ANOSCOPY SPX: CPT | Performed by: SURGERY

## 2022-01-11 NOTE — LETTER
1/11/2022         RE: Cleveland Maciel  [address] [facility] [address]        Dear Colleague,    Thank you for referring your patient, Cleveland Maciel, to the [facility]. Please see a copy of my visit note below.    Dear Donovan Kerns  I have seen Cleveland Maciel and as you know his chief complaint is feels like he has a hemorrhoid.  A little hard to wipe so as not to irritate.      HPI:  Patient is a 51 year old male  with complaints see above  The symptoms started 2 years  And then had some bleeding in his stool.   Patient has not family history of colon cancer  nothing makes the episode better.      Review Of Systems  Respiratory: No shortness of breath, dyspnea on exertion, cough, or hemoptysis  Cardiovascular: negative  Gastrointestinal: negative  Endocrine: negative  Patient is not taking fiber supplements   BP (!) 147/80   Pulse 69   Wt 92.1 kg (203 lb)   BMI 29.55 kg/m        Past Medical History:   Diagnosis Date     ADD (attention deficit disorder)        Past Surgical History:   Procedure Laterality Date     KNEE SURGERY Left     ACL     OPTICAL TRACKING SYSTEM ENDOSCOPIC SINUS SURGERY Bilateral 11/15/2021    Procedure: SINUS SURGERY, ENDOSCOPIC, USING OPTICAL TRACKING SYSTEM, Total Ethmoidectomy Bilateral Maxillary Antrostomies, bilateral spheniodotomy, Bilateral frontal sinusotomy;  Surgeon: Lopez Turner MD;  Location: [facility] OR     SEPTOPLASTY, TURBINOPLASTY, COMBINED N/A 11/15/2021    Procedure: septoplasty; bilateral inferior turbinate reduction;  Surgeon: Lopez Turner MD;  Location: [facility] OR     VARICOCELECTOMY         Current Outpatient Medications   Medication Sig Dispense Refill     fluticasone (FLONASE) 50 MCG/ACT nasal spray Spray 2 sprays into both nostrils daily 16 g 11     HYDROcodone-acetaminophen (NORCO) 5-325 MG tablet Take 1-2 tablets by mouth every 6 hours as needed for moderate to severe pain (Patient not taking:  Reported on 11/23/2021) 15 tablet 0     loratadine (CLARITIN) 10 MG tablet Take 10 mg by mouth daily  (Patient not taking: Reported on 11/23/2021)         Social History     Socioeconomic History     Marital status:      Spouse name: Not on file     Number of children: Not on file     Years of education: Not on file     Highest education level: Not on file   Occupational History     Not on file   Tobacco Use     Smoking status: Never Smoker     Smokeless tobacco: Current User     Types: Chew   Vaping Use     Vaping Use: Never used   Substance and Sexual Activity     Alcohol use: Yes     Drug use: Not Currently     Sexual activity: Yes     Partners: Female   Other Topics Concern     Not on file   Social History Narrative     Not on file     Social Determinants of Health     Financial Resource Strain: Not on file   Food Insecurity: Not on file   Transportation Needs: Not on file   Physical Activity: Not on file   Stress: Not on file   Social Connections: Not on file   Intimate Partner Violence: Not on file   Housing Stability: Not on file       Physical exam:  Patient able to get up on table without difficulty.  Head eyes, nose and mouth within normal limits.  No supraclavicular or cervical adenopathy palpated.  Thyroid within normal limits.  No carotid bruits auscultated.  Lungs are clear to auscultation  Heart is regular rate and rhythm with no murmur or thrills noted.  No costal vertebral angle tenderness noted.  Abdomen is abdomen is soft without significant tenderness, masses, organomegaly or guarding  bowel sounds are positive and no caput medusa noted.  No obvious hernias ventral hernia noted.  Easily palpable posterior tibial pulse or dorsalis pedis pulse bilaterally.  Lower extremity edema is not present.    Rectal exam: with anal scopeexternal hemorrhoidal noted  right posterior may be one but has sensation where I would like to band and this seems like 2 hemorrhoids on the right and right  posterior.                Discussed treatment for anal discomfort.  The use of fiber and what the options are.  Keeping the area clean.  The use of steroid creams.  Also discussed use of tight fitting work out underwear that will help keep skin from rubbing together.       Discussed internal hemorrhoid banding with risks including bleeding, pain and infection.  And an infection that may need surgery if gets bad enough.  Also discussed the process and what to expect with it.      Discussed hemorrhoid care including fiber supplements (metamucil and flax seed ground), not spending much time on the toilet.  Also discussed anusol HC  and how to use it for swelling and discomfort.  Discussed ways to keep are clean.  I also briefly discussed hemorrhoid surgery and the discomforts with it.  Encouraged patient to do conservative treatment first.      Discussed hemorrhoid surgery including pain, what to expect after surgery, scarring and anal stenosis, how I do the surgery, and that they still can have hemorroids in the future.  I discussed that I only try to do the larger hemorrhoids and become less aggressive with the smaller ones to decrease the chance of anal stenosis.    Rectal exam: with anal scopeexternal hemorrhoidal noted  right posterior may be one but has sensation where I would like to band and this seems like 2 hemorrhoids on the right and right posterior.    Plan will do colonoscopy[py in the OR with  General anesthesia  Or MAC and then can do the right and right posterior hemorrhoidectomy in the lithotomy position      Time spent with the patient with greater that 50% of the time in discussion was 30 minutes.      Jayce Kwan MD      Again, thank you for allowing me to participate in the care of your patient.        Sincerely,        Jayce Kwan MD

## 2022-01-11 NOTE — PROGRESS NOTES
Dear Donovan Kerns  I have seen Cleveland Maciel and as you know his chief complaint is feels like he has a hemorrhoid.  A little hard to wipe so as not to irritate.      HPI:  Patient is a 51 year old male  with complaints see above  The symptoms started 2 years  And then had some bleeding in his stool.   Patient has not family history of colon cancer  nothing makes the episode better.      Review Of Systems  Respiratory: No shortness of breath, dyspnea on exertion, cough, or hemoptysis  Cardiovascular: negative  Gastrointestinal: negative  Endocrine: negative  Patient is not taking fiber supplements   BP (!) 147/80   Pulse 69   Wt 92.1 kg (203 lb)   BMI 29.55 kg/m        Past Medical History:   Diagnosis Date     ADD (attention deficit disorder)        Past Surgical History:   Procedure Laterality Date     KNEE SURGERY Left     ACL     OPTICAL TRACKING SYSTEM ENDOSCOPIC SINUS SURGERY Bilateral 11/15/2021    Procedure: SINUS SURGERY, ENDOSCOPIC, USING OPTICAL TRACKING SYSTEM, Total Ethmoidectomy Bilateral Maxillary Antrostomies, bilateral spheniodotomy, Bilateral frontal sinusotomy;  Surgeon: Lopez Turner MD;  Location: [facility] OR     SEPTOPLASTY, TURBINOPLASTY, COMBINED N/A 11/15/2021    Procedure: septoplasty; bilateral inferior turbinate reduction;  Surgeon: Lopez Turner MD;  Location: [facility] OR     VARICOCELECTOMY         Current Outpatient Medications   Medication Sig Dispense Refill     fluticasone (FLONASE) 50 MCG/ACT nasal spray Spray 2 sprays into both nostrils daily 16 g 11     HYDROcodone-acetaminophen (NORCO) 5-325 MG tablet Take 1-2 tablets by mouth every 6 hours as needed for moderate to severe pain (Patient not taking: Reported on 11/23/2021) 15 tablet 0     loratadine (CLARITIN) 10 MG tablet Take 10 mg by mouth daily  (Patient not taking: Reported on 11/23/2021)         Social History     Socioeconomic History     Marital status:      Spouse name: Not on file     Number of  children: Not on file     Years of education: Not on file     Highest education level: Not on file   Occupational History     Not on file   Tobacco Use     Smoking status: Never Smoker     Smokeless tobacco: Current User     Types: Chew   Vaping Use     Vaping Use: Never used   Substance and Sexual Activity     Alcohol use: Yes     Drug use: Not Currently     Sexual activity: Yes     Partners: Female   Other Topics Concern     Not on file   Social History Narrative     Not on file     Social Determinants of Health     Financial Resource Strain: Not on file   Food Insecurity: Not on file   Transportation Needs: Not on file   Physical Activity: Not on file   Stress: Not on file   Social Connections: Not on file   Intimate Partner Violence: Not on file   Housing Stability: Not on file       Physical exam:  Patient able to get up on table without difficulty.  Head eyes, nose and mouth within normal limits.  No supraclavicular or cervical adenopathy palpated.  Thyroid within normal limits.  No carotid bruits auscultated.  Lungs are clear to auscultation  Heart is regular rate and rhythm with no murmur or thrills noted.  No costal vertebral angle tenderness noted.  Abdomen is abdomen is soft without significant tenderness, masses, organomegaly or guarding  bowel sounds are positive and no caput medusa noted.  No obvious hernias ventral hernia noted.  Easily palpable posterior tibial pulse or dorsalis pedis pulse bilaterally.  Lower extremity edema is not present.    Rectal exam: with anal scopeexternal hemorrhoidal noted  right posterior may be one but has sensation where I would like to band and this seems like 2 hemorrhoids on the right and right posterior.                Discussed treatment for anal discomfort.  The use of fiber and what the options are.  Keeping the area clean.  The use of steroid creams.  Also discussed use of tight fitting work out underwear that will help keep skin from rubbing together.        Discussed internal hemorrhoid banding with risks including bleeding, pain and infection.  And an infection that may need surgery if gets bad enough.  Also discussed the process and what to expect with it.      Discussed hemorrhoid care including fiber supplements (metamucil and flax seed ground), not spending much time on the toilet.  Also discussed anusol HC  and how to use it for swelling and discomfort.  Discussed ways to keep are clean.  I also briefly discussed hemorrhoid surgery and the discomforts with it.  Encouraged patient to do conservative treatment first.      Discussed hemorrhoid surgery including pain, what to expect after surgery, scarring and anal stenosis, how I do the surgery, and that they still can have hemorroids in the future.  I discussed that I only try to do the larger hemorrhoids and become less aggressive with the smaller ones to decrease the chance of anal stenosis.    Rectal exam: with anal scopeexternal hemorrhoidal noted  right posterior may be one but has sensation where I would like to band and this seems like 2 hemorrhoids on the right and right posterior.    Plan will do colonoscopy[py in the OR with  General anesthesia  Or MAC and then can do the right and right posterior hemorrhoidectomy in the lithotomy position      Time spent with the patient with greater that 50% of the time in discussion was 30 minutes.      Jayce Kwan MD

## 2022-01-11 NOTE — TELEPHONE ENCOUNTER
Unable to leave a message on patient phone number. I have sent patient a my-chart message with surgery scheduling phone number to call to schedule.

## 2022-01-11 NOTE — PATIENT INSTRUCTIONS
Rectal exam: with anal scopeexternal hemorrhoidal noted  right posterior may be one but has sensation where I would like to band and this seems like 2 hemorrhoids on the right and right posterior.            Discussed treatment for anal discomfort.  The use of fiber and what the options are.  Keeping the area clean.  The use of steroid creams.  Also discussed use of tight fitting work out underwear that will help keep skin from rubbing together.       Discussed internal hemorrhoid banding with risks including bleeding, pain and infection.  And an infection that may need surgery if gets bad enough.  Also discussed the process and what to expect with it.      Discussed hemorrhoid care including fiber supplements (metamucil and flax seed ground), not spending much time on the toilet.  Also discussed anusol HC  and how to use it for swelling and discomfort.  Discussed ways to keep are clean.  I also briefly discussed hemorrhoid surgery and the discomforts with it.  Encouraged patient to do conservative treatment first.      Discussed hemorrhoid surgery including pain, what to expect after surgery, scarring and anal stenosis, how I do the surgery, and that they still can have hemorroids in the future.  I discussed that I only try to do the larger hemorrhoids and become less aggressive with the smaller ones to decrease the chance of anal stenosis.    Rectal exam: with anal scopeexternal hemorrhoidal noted  right posterior may be one but has sensation where I would like to band and this seems like 2 hemorrhoids on the right and right posterior.    Plan will do colonoscopy[py in the OR with  General anesthesia  Or MAC and then can do the right and right posterior hemorrhoidectomy in the lithotomy position    HEMORRHOIDECTOMY DISCHARGE INSTRUCTIONS  DR. AMARI STOREY    1. You may resume your regular diet when you feel you are ready to.  DO NOT drink alcoholic beverages for 24 hours of while you are taking prescription  medication.  2. Limit your activities for the first 48 hours.  Gradually increase them as tolerated.  You may use stairs.  I encourage you to walk as tolerated.  3. You will have some discomfort at the incision sites.  This is expected.  This should improve over the next 2-3 days.  Ice and pain medication will help with this pain.  Use prescribed pain medication as instructed.  4. Bruising and mild swelling is normal after surgery.  The area below and around the incision(s) will be hard and elevated.  This is normal.  I call it the healing ridge.  This will resolve slowly over the next several months.  If you feel the pain is increasing and cannot explain it by increasing activity please call us at [phone].    5. The dressing will often have some blood on it.  You may shower 24 hours after surgery.  Clean gently over incision site.  If clear plastic covering or steri-strip comes off and there is still some bleeding or drainage then cover gauze or band-aid.  If no bleeding there is no reason to cover site.    6. Avoid Aspirin for the first 72 hours after the procedure.  This medication may increase the tendency to bleed.  7. Use the following medications (in addition to your normal meds) as shown:  Name of Medicine  Dose  Frequency  Reason  a. Percocet 5mg 1-2 ever 6 hours as needed for pain.  This contains 500 mg of Tylenol (acetaminophen) per tablet.  For example, you may take 1 Percocet and 1 Tylenol, OR 2 Percocet and no Tylenol, OR 2 Tylenol and no Percocet every 6 hours.  b. Tylenol (acetaminophen) 500 mg every 6 hours as needed for pain.  Do not take more than 1000 mg every 6 hours.  (see above)  c. Motrin (ibuprophen) 200-800 mg every 6 hours as needed for pain.  Take with food.    8. Notify [facility] at [phone]  if:    Your discomfort is not relieved by your pain medication.    You have signs of infection such as temperature above 100.5 degrees orally, chills, or increasing daily  discomfort.    Incision site is becoming more red and/ or there is purulent drainage.    You have questions or concerns.  9. Please call [phone] to schedule a follow up appointment in 3 weeks(s)  10. When taking narcotics (pain medication more than Tylenol (acetaminophen) and Motrin (ibuprophen) it is important to keep your stools soft to avoid constipation and pain with straining.  This is best done by drinking fluids (nonalcoholic and non-caffeinated) and taking a stool softener i.e. Metamucil or milk of magnesia.  You may be able to use non-narcotics for pain relief especially by the 3rd post-operative day.  Tylenol 500 mg every 6 hours and/ or Motrin (ibuprophen) 200-800 mg every 6 hours.  Please do not take more than 4 grams of Tylenol (acetaminophen) per day.  Remember your Percocet does have Tylenol (acetaminophen) already in it.  If you have a history of stomach ulcers or stomach problems than do not take the Motrin (ibuprophen).  Please take Motrin (ibuprophen) with food to help protect the stomach.    11.  Do not drive or operate heavy machinery for 24 hours after surgery or when taking narcotics.  You may resume driving when feel that you can safely avoid an accident and are not taking narcotics.  This is usually 5 to 7 days after surgery.  You should not be alone for 24 hours after surgery.      Viscus lidocaine is to apply to anus for some relief of pain.  Apply as often as you like.  Valium is to help relax the anal sphincter.  If you are going to be at home you may take it every 8 hours.  If more active or working then just at bedtime.  Anusol HC is to help bring down the swelling.  You may take three a day either spread out over the day or with bowel movements.

## 2022-01-24 NOTE — TELEPHONE ENCOUNTER
Returning patients call left a voice message for patient to call surgery scheduling at [phone]

## 2022-01-25 PROBLEM — K64.4 EXTERNAL HEMORRHOIDS: Status: ACTIVE | Noted: 2022-01-25

## 2022-01-25 PROBLEM — Z12.11 SPECIAL SCREENING FOR MALIGNANT NEOPLASMS, COLON: Status: ACTIVE | Noted: 2022-01-25

## 2022-01-26 NOTE — TELEPHONE ENCOUNTER
Type of surgery: Right posterior and right sided hemorroiedctomy with harmonic scalpel at same time as colonsocopy right   CPT 51958, [id number]    Special screening for malignant neoplasms, colon Z12.11     External hemorrhoids K64.4    Location of surgery: [facility]  Date and time of surgery: 2-24-22  TBD  Surgeon: Dr Kwan  Pre-Op Appt Date: 2-15-22  Post-Op Appt Date: 3-8-22   Packet sent out: Yes  Pre-cert/Authorization completed:   No prior auth required per [employer] online list.     Date: 1-26-22    Kelly Johnson  Prior Authorization Dept  [phone]

## 2022-02-07 DIAGNOSIS — Z11.59 ENCOUNTER FOR SCREENING FOR OTHER VIRAL DISEASES: Primary | ICD-10-CM

## 2022-02-14 NOTE — H&P (VIEW-ONLY)
[facility]  [address] [address]  Phone: [phone]  Primary Provider: Donovan Pride  Pre-op Performing Provider: RODNEY BROWN      PREOPERATIVE EVALUATION:  Today's date: 2/15/2022    lCeveland Maciel is a 51 year old male who presents for a preoperative evaluation.    Surgical Information:  Surgery/Procedure: colonoscopy with possible polypectomy possible biopsy and right and posterior right hemorrhoidectomy with harmonic scalpel.  Surgery Location: [facility]  Surgeon: Dr. Kwan  Surgery Date: 02/24/2022  Time of Surgery: 11:45am  Where patient plans to recover: At home with family  Fax number for surgical facility: Note does not need to be faxed, will be available electronically in Epic.    Type of Anesthesia Anticipated: to be determined    Assessment & Plan     The proposed surgical procedure is considered LOW risk.    ICD-10-CM    1. Pre-op exam  Z01.818 Hemoglobin   2. Special screening for malignant neoplasms, colon  Z12.11    3. External hemorrhoids  K64.4           Risks and Recommendations:  The patient has the following additional risks and recommendations for perioperative complications:   - No identified additional risk factors other than previously addressed    Medication Instructions:  Patient is on no chronic medications    RECOMMENDATION:  APPROVAL GIVEN to proceed with proposed procedure, without further diagnostic evaluation.    Subjective     HPI related to upcoming procedure: colon cancer screening and external hemorrhoid.       Preop Questions 2/15/2022   1. Have you ever had a heart attack or stroke? No   2. Have you ever had surgery on your heart or blood vessels, such as a stent placement, a coronary artery bypass, or surgery on an artery in your head, neck, heart, or legs? YES - varicose vein   3. Do you have chest pain with activity? No   4. Do you have a history of  heart failure? No    5. Do you currently have a cold, bronchitis or symptoms of other infection? No   6. Do you have a cough, shortness of breath, or wheezing? No   7. Do you or anyone in your family have previous history of blood clots? No   8. Do you or does anyone in your family have a serious bleeding problem such as prolonged bleeding following surgeries or cuts? No   9. Have you ever had problems with anemia or been told to take iron pills? No   10. Have you had any abnormal blood loss such as black, tarry or bloody stools? No   11. Have you ever had a blood transfusion? No   12. Are you willing to have a blood transfusion if it is medically needed before, during, or after your surgery? Yes   13. Have you or any of your relatives ever had problems with anesthesia? No   14. Do you have sleep apnea, excessive snoring or daytime drowsiness? No   15. Do you have any artifical heart valves or other implanted medical devices like a pacemaker, defibrillator, or continuous glucose monitor? No   16. Do you have artificial joints? No   17. Are you allergic to latex? No     Health Care Directive:  Patient does not have a Health Care Directive or Living Will: Patient states has Advance Directive and will bring in a copy to clinic.     Status of Chronic Conditions:  See problem list for active medical problems.  Problems all longstanding and stable, except as noted/documented.  See ROS for pertinent symptoms related to these conditions.      Review of Systems  CONSTITUTIONAL: NEGATIVE for fever, chills, change in weight  INTEGUMENTARY/SKIN: NEGATIVE for worrisome rashes, moles or lesions  EYES: NEGATIVE for vision changes or irritation  ENT/MOUTH: NEGATIVE for ear, mouth and throat problems  RESP: NEGATIVE for significant cough or SOB  CV: NEGATIVE for chest pain, palpitations or peripheral edema  GI: NEGATIVE for nausea, abdominal pain, heartburn, or change in bowel habits  : NEGATIVE for frequency, dysuria, or hematuria  MUSCULOSKELETAL:  NEGATIVE for significant arthralgias or myalgia  NEURO: NEGATIVE for weakness, dizziness or paresthesias  ENDOCRINE: NEGATIVE for temperature intolerance, skin/hair changes  HEME: NEGATIVE for bleeding problems  PSYCHIATRIC: NEGATIVE for changes in mood or affect    Patient Active Problem List    Diagnosis Date Noted     Special screening for malignant neoplasms, colon 01/25/2022     Priority: Medium     Added automatically from request for surgery [id number]       External hemorrhoids 01/25/2022     Priority: Medium     Added automatically from request for surgery [id number]       Chronic pansinusitis 10/15/2021     Priority: Medium     Added automatically from request for surgery [id number]       Nasal polyp 10/15/2021     Priority: Medium     Added automatically from request for surgery [id number]       Nasal septal deviation 10/15/2021     Priority: Medium     Added automatically from request for surgery [id number]       Nasal turbinate hypertrophy 10/15/2021     Priority: Medium     Added automatically from request for surgery [id number]       Nasal obstruction 10/15/2021     Priority: Medium     Added automatically from request for surgery [id number]        Past Medical History:   Diagnosis Date     ADD (attention deficit disorder)      Past Surgical History:   Procedure Laterality Date     KNEE SURGERY Left     ACL     OPTICAL TRACKING SYSTEM ENDOSCOPIC SINUS SURGERY Bilateral 11/15/2021    Procedure: SINUS SURGERY, ENDOSCOPIC, USING OPTICAL TRACKING SYSTEM, Total Ethmoidectomy Bilateral Maxillary Antrostomies, bilateral spheniodotomy, Bilateral frontal sinusotomy;  Surgeon: Lopez Turner MD;  Location: [facility]     SEPTOPLASTY, TURBINOPLASTY, COMBINED N/A 11/15/2021    Procedure: septoplasty; bilateral inferior turbinate reduction;  Surgeon: Lopez Turner MD;  Location: [facility]     VARICOCELECTOMY       Current Outpatient Medications   Medication Sig Dispense Refill     fluticasone (FLONASE) 50 MCG/ACT nasal spray Spray 2  "sprays into both nostrils daily 16 g 11       No Known Allergies     Social History     Tobacco Use     Smoking status: Never Smoker     Smokeless tobacco: Current User     Types: Chew   Substance Use Topics     Alcohol use: Yes     No family history on file.  History   Drug Use Unknown         Objective     /80   Pulse 69   Temp (!) 96  F (35.6  C) (Tympanic)   Resp 16   Ht 1.765 m (5' 9.5\")   Wt 93 kg (205 lb)   SpO2 98%   BMI 29.84 kg/m      Physical Exam    GENERAL APPEARANCE: healthy, alert and no distress     EYES: EOMI,  PERRL     HENT: ear canals and TM's normal and nose and mouth without ulcers or lesions     NECK: no adenopathy, no asymmetry, masses, or scars and thyroid normal to palpation     RESP: lungs clear to auscultation - no rales, rhonchi or wheezes     CV: regular rates and rhythm, normal S1 S2, no S3 or S4 and no murmur, click or rub     ABDOMEN:  soft, nontender, no HSM or masses and bowel sounds normal     MS: extremities normal- no gross deformities noted, no evidence of inflammation in joints, FROM in all extremities.     SKIN: no suspicious lesions or rashes     NEURO: Normal strength and tone, sensory exam grossly normal, mentation intact and speech normal     PSYCH: mentation appears normal. and affect normal/bright     LYMPHATICS: No cervical adenopathy    Recent Labs   Lab Test 09/10/21  1017   HGB 15.8         POTASSIUM 4.6   CR 1.01        Diagnostics:  Labs pending at this time.  Results will be reviewed when available.   No EKG required for low risk surgery (cataract, skin procedure, breast biopsy, etc).  Last EKG and Echo: 2021:   EKG: Sinus  Rhythm   -Old anterior infarct.     Normal Stress echo.    Revised Cardiac Risk Index (RCRI):  The patient has the following serious cardiovascular risks for perioperative complications:   - No serious cardiac risks = 0 points     RCRI Interpretation: 0 points: Class I (very low risk - 0.4% complication rate)       "     Signed Electronically by: Omi Ibarra PA-C  Copy of this evaluation report is provided to requesting physician.  Donovan Pride MD

## 2022-02-14 NOTE — PROGRESS NOTES
[facility]  [address] [facility] [address]  Phone: [phone]  Primary Provider: Donovan Pride  Pre-op Performing Provider: RODNEY BROWN      PREOPERATIVE EVALUATION:  Today's date: 2/15/2022    Cleveland Maciel is a 51 year old male who presents for a preoperative evaluation.    Surgical Information:  Surgery/Procedure: colonoscopy with possible polypectomy possible biopsy and right and posterior right hemorrhoidectomy with harmonic scalpel.  Surgery Location: [facility]  Surgeon: Dr. Kwan  Surgery Date: 02/24/2022  Time of Surgery: 11:45am  Where patient plans to recover: At home with family  Fax number for surgical facility: Note does not need to be faxed, will be available electronically in Epic.    Type of Anesthesia Anticipated: to be determined    Assessment & Plan     The proposed surgical procedure is considered LOW risk.    ICD-10-CM    1. Pre-op exam  Z01.818 Hemoglobin   2. Special screening for malignant neoplasms, colon  Z12.11    3. External hemorrhoids  K64.4           Risks and Recommendations:  The patient has the following additional risks and recommendations for perioperative complications:   - No identified additional risk factors other than previously addressed    Medication Instructions:  Patient is on no chronic medications    RECOMMENDATION:  APPROVAL GIVEN to proceed with proposed procedure, without further diagnostic evaluation.    Subjective     HPI related to upcoming procedure: colon cancer screening and external hemorrhoid.       Preop Questions 2/15/2022   1. Have you ever had a heart attack or stroke? No   2. Have you ever had surgery on your heart or blood vessels, such as a stent placement, a coronary artery bypass, or surgery on an artery in your head, neck, heart, or legs? YES - varicose vein   3. Do you have chest pain with activity? No   4. Do you have a history of  heart failure? No    5. Do you currently have a cold, bronchitis or symptoms of other infection? No   6. Do you have a cough, shortness of breath, or wheezing? No   7. Do you or anyone in your family have previous history of blood clots? No   8. Do you or does anyone in your family have a serious bleeding problem such as prolonged bleeding following surgeries or cuts? No   9. Have you ever had problems with anemia or been told to take iron pills? No   10. Have you had any abnormal blood loss such as black, tarry or bloody stools? No   11. Have you ever had a blood transfusion? No   12. Are you willing to have a blood transfusion if it is medically needed before, during, or after your surgery? Yes   13. Have you or any of your relatives ever had problems with anesthesia? No   14. Do you have sleep apnea, excessive snoring or daytime drowsiness? No   15. Do you have any artifical heart valves or other implanted medical devices like a pacemaker, defibrillator, or continuous glucose monitor? No   16. Do you have artificial joints? No   17. Are you allergic to latex? No     Health Care Directive:  Patient does not have a Health Care Directive or Living Will: Patient states has Advance Directive and will bring in a copy to clinic.     Status of Chronic Conditions:  See problem list for active medical problems.  Problems all longstanding and stable, except as noted/documented.  See ROS for pertinent symptoms related to these conditions.      Review of Systems  CONSTITUTIONAL: NEGATIVE for fever, chills, change in weight  INTEGUMENTARY/SKIN: NEGATIVE for worrisome rashes, moles or lesions  EYES: NEGATIVE for vision changes or irritation  ENT/MOUTH: NEGATIVE for ear, mouth and throat problems  RESP: NEGATIVE for significant cough or SOB  CV: NEGATIVE for chest pain, palpitations or peripheral edema  GI: NEGATIVE for nausea, abdominal pain, heartburn, or change in bowel habits  : NEGATIVE for frequency, dysuria, or hematuria  MUSCULOSKELETAL:  NEGATIVE for significant arthralgias or myalgia  NEURO: NEGATIVE for weakness, dizziness or paresthesias  ENDOCRINE: NEGATIVE for temperature intolerance, skin/hair changes  HEME: NEGATIVE for bleeding problems  PSYCHIATRIC: NEGATIVE for changes in mood or affect    Patient Active Problem List    Diagnosis Date Noted     Special screening for malignant neoplasms, colon 01/25/2022     Priority: Medium     Added automatically from request for surgery [id number]       External hemorrhoids 01/25/2022     Priority: Medium     Added automatically from request for surgery [id number]       Chronic pansinusitis 10/15/2021     Priority: Medium     Added automatically from request for surgery [id number]       Nasal polyp 10/15/2021     Priority: Medium     Added automatically from request for surgery [id number]       Nasal septal deviation 10/15/2021     Priority: Medium     Added automatically from request for surgery [id number]       Nasal turbinate hypertrophy 10/15/2021     Priority: Medium     Added automatically from request for surgery [id number]       Nasal obstruction 10/15/2021     Priority: Medium     Added automatically from request for surgery [id number]        Past Medical History:   Diagnosis Date     ADD (attention deficit disorder)      Past Surgical History:   Procedure Laterality Date     KNEE SURGERY Left     ACL     OPTICAL TRACKING SYSTEM ENDOSCOPIC SINUS SURGERY Bilateral 11/15/2021    Procedure: SINUS SURGERY, ENDOSCOPIC, USING OPTICAL TRACKING SYSTEM, Total Ethmoidectomy Bilateral Maxillary Antrostomies, bilateral spheniodotomy, Bilateral frontal sinusotomy;  Surgeon: Lopez Turner MD;  Location: [facility]     SEPTOPLASTY, TURBINOPLASTY, COMBINED N/A 11/15/2021    Procedure: septoplasty; bilateral inferior turbinate reduction;  Surgeon: Lopez Turner MD;  Location: [facility]     VARICOCELECTOMY       Current Outpatient Medications   Medication Sig Dispense Refill     fluticasone (FLONASE) 50 MCG/ACT nasal spray Spray 2  "sprays into both nostrils daily 16 g 11       No Known Allergies     Social History     Tobacco Use     Smoking status: Never Smoker     Smokeless tobacco: Current User     Types: Chew   Substance Use Topics     Alcohol use: Yes     No family history on file.  History   Drug Use Unknown         Objective     /80   Pulse 69   Temp (!) 96  F (35.6  C) (Tympanic)   Resp 16   Ht 1.765 m (5' 9.5\")   Wt 93 kg (205 lb)   SpO2 98%   BMI 29.84 kg/m      Physical Exam    GENERAL APPEARANCE: healthy, alert and no distress     EYES: EOMI,  PERRL     HENT: ear canals and TM's normal and nose and mouth without ulcers or lesions     NECK: no adenopathy, no asymmetry, masses, or scars and thyroid normal to palpation     RESP: lungs clear to auscultation - no rales, rhonchi or wheezes     CV: regular rates and rhythm, normal S1 S2, no S3 or S4 and no murmur, click or rub     ABDOMEN:  soft, nontender, no HSM or masses and bowel sounds normal     MS: extremities normal- no gross deformities noted, no evidence of inflammation in joints, FROM in all extremities.     SKIN: no suspicious lesions or rashes     NEURO: Normal strength and tone, sensory exam grossly normal, mentation intact and speech normal     PSYCH: mentation appears normal. and affect normal/bright     LYMPHATICS: No cervical adenopathy    Recent Labs   Lab Test 09/10/21  1017   HGB 15.8         POTASSIUM 4.6   CR 1.01        Diagnostics:  Labs pending at this time.  Results will be reviewed when available.   No EKG required for low risk surgery (cataract, skin procedure, breast biopsy, etc).  Last EKG and Echo: 2021:   EKG: Sinus  Rhythm   -Old anterior infarct.     Normal Stress echo.    Revised Cardiac Risk Index (RCRI):  The patient has the following serious cardiovascular risks for perioperative complications:   - No serious cardiac risks = 0 points     RCRI Interpretation: 0 points: Class I (very low risk - 0.4% complication rate)       "     Signed Electronically by: Omi Ibarra PA-C  Copy of this evaluation report is provided to requesting physician.  Donovan Pride MD

## 2022-02-15 ENCOUNTER — OFFICE VISIT (OUTPATIENT)
Dept: FAMILY MEDICINE | Facility: CLINIC | Age: 52
End: 2022-02-15
Payer: COMMERCIAL

## 2022-02-15 VITALS
OXYGEN SATURATION: 98 % | BODY MASS INDEX: 29.35 KG/M2 | SYSTOLIC BLOOD PRESSURE: 136 MMHG | WEIGHT: 205 LBS | DIASTOLIC BLOOD PRESSURE: 80 MMHG | HEIGHT: 70 IN | HEART RATE: 69 BPM | RESPIRATION RATE: 16 BRPM | TEMPERATURE: 96 F

## 2022-02-15 DIAGNOSIS — Z01.818 PRE-OP EXAM: Primary | ICD-10-CM

## 2022-02-15 DIAGNOSIS — K64.4 EXTERNAL HEMORRHOIDS: ICD-10-CM

## 2022-02-15 DIAGNOSIS — Z12.11 SPECIAL SCREENING FOR MALIGNANT NEOPLASMS, COLON: ICD-10-CM

## 2022-02-15 LAB — HGB BLD-MCNC: 15.9 G/DL (ref 13.3–17.7)

## 2022-02-15 PROCEDURE — 36415 COLL VENOUS BLD VENIPUNCTURE: CPT | Performed by: PHYSICIAN ASSISTANT

## 2022-02-15 PROCEDURE — 99214 OFFICE O/P EST MOD 30 MIN: CPT | Performed by: PHYSICIAN ASSISTANT

## 2022-02-15 PROCEDURE — 85018 HEMOGLOBIN: CPT | Performed by: PHYSICIAN ASSISTANT

## 2022-02-15 ASSESSMENT — MIFFLIN-ST. JEOR: SCORE: 1783.18

## 2022-02-21 ENCOUNTER — LAB (OUTPATIENT)
Dept: LAB | Facility: CLINIC | Age: 52
End: 2022-02-21
Payer: COMMERCIAL

## 2022-02-21 DIAGNOSIS — Z11.59 ENCOUNTER FOR SCREENING FOR OTHER VIRAL DISEASES: ICD-10-CM

## 2022-02-21 LAB — SARS-COV-2 RNA RESP QL NAA+PROBE: NEGATIVE

## 2022-02-21 PROCEDURE — U0005 INFEC AGEN DETEC AMPLI PROBE: HCPCS

## 2022-02-21 PROCEDURE — U0003 INFECTIOUS AGENT DETECTION BY NUCLEIC ACID (DNA OR RNA); SEVERE ACUTE RESPIRATORY SYNDROME CORONAVIRUS 2 (SARS-COV-2) (CORONAVIRUS DISEASE [COVID-19]), AMPLIFIED PROBE TECHNIQUE, MAKING USE OF HIGH THROUGHPUT TECHNOLOGIES AS DESCRIBED BY CMS-2020-01-R: HCPCS

## 2022-02-24 ENCOUNTER — HOSPITAL ENCOUNTER (OUTPATIENT)
Facility: AMBULATORY SURGERY CENTER | Age: 52
End: 2022-02-24
Attending: SURGERY | Admitting: SURGERY
Payer: COMMERCIAL

## 2022-02-24 ENCOUNTER — ANESTHESIA EVENT (OUTPATIENT)
Dept: SURGERY | Facility: AMBULATORY SURGERY CENTER | Age: 52
End: 2022-02-24
Payer: COMMERCIAL

## 2022-02-24 ENCOUNTER — ANESTHESIA (OUTPATIENT)
Dept: SURGERY | Facility: AMBULATORY SURGERY CENTER | Age: 52
End: 2022-02-24
Payer: COMMERCIAL

## 2022-02-24 VITALS
OXYGEN SATURATION: 97 % | HEIGHT: 70 IN | WEIGHT: 205 LBS | TEMPERATURE: 97.1 F | DIASTOLIC BLOOD PRESSURE: 76 MMHG | HEART RATE: 50 BPM | SYSTOLIC BLOOD PRESSURE: 131 MMHG | BODY MASS INDEX: 29.35 KG/M2 | RESPIRATION RATE: 20 BRPM

## 2022-02-24 DIAGNOSIS — Z12.11 SPECIAL SCREENING FOR MALIGNANT NEOPLASMS, COLON: ICD-10-CM

## 2022-02-24 DIAGNOSIS — K64.4 EXTERNAL HEMORRHOIDS: ICD-10-CM

## 2022-02-24 LAB — COLONOSCOPY: NORMAL

## 2022-02-24 PROCEDURE — 45385 COLONOSCOPY W/LESION REMOVAL: CPT

## 2022-02-24 PROCEDURE — G8907 PT DOC NO EVENTS ON DISCHARG: HCPCS

## 2022-02-24 PROCEDURE — 46260 REMOVE IN/EX HEM GROUPS 2+: CPT | Performed by: SURGERY

## 2022-02-24 PROCEDURE — 45385 COLONOSCOPY W/LESION REMOVAL: CPT | Mod: 59 | Performed by: SURGERY

## 2022-02-24 PROCEDURE — 88304 TISSUE EXAM BY PATHOLOGIST: CPT | Performed by: PATHOLOGY

## 2022-02-24 PROCEDURE — 88305 TISSUE EXAM BY PATHOLOGIST: CPT | Performed by: PATHOLOGY

## 2022-02-24 PROCEDURE — G8918 PT W/O PREOP ORDER IV AB PRO: HCPCS

## 2022-02-24 RX ORDER — CEFAZOLIN SODIUM 2 G/100ML
2 INJECTION, SOLUTION INTRAVENOUS SEE ADMIN INSTRUCTIONS
Status: DISCONTINUED | OUTPATIENT
Start: 2022-02-24 | End: 2022-02-25 | Stop reason: HOSPADM

## 2022-02-24 RX ORDER — NALOXONE HYDROCHLORIDE 0.4 MG/ML
0.4 INJECTION, SOLUTION INTRAMUSCULAR; INTRAVENOUS; SUBCUTANEOUS
Status: DISCONTINUED | OUTPATIENT
Start: 2022-02-24 | End: 2022-02-25 | Stop reason: HOSPADM

## 2022-02-24 RX ORDER — ONDANSETRON 4 MG/1
4 TABLET, ORALLY DISINTEGRATING ORAL EVERY 6 HOURS PRN
Status: DISCONTINUED | OUTPATIENT
Start: 2022-02-24 | End: 2022-02-25 | Stop reason: HOSPADM

## 2022-02-24 RX ORDER — PROPOFOL 10 MG/ML
INJECTION, EMULSION INTRAVENOUS CONTINUOUS PRN
Status: DISCONTINUED | OUTPATIENT
Start: 2022-02-24 | End: 2022-02-24

## 2022-02-24 RX ORDER — OXYCODONE HYDROCHLORIDE 5 MG/1
5 TABLET ORAL EVERY 6 HOURS PRN
Qty: 12 TABLET | Refills: 0 | Status: SHIPPED | OUTPATIENT
Start: 2022-02-24 | End: 2022-02-27

## 2022-02-24 RX ORDER — LIDOCAINE 50 MG/G
OINTMENT TOPICAL
Qty: 50 G | Refills: 1 | Status: SHIPPED | OUTPATIENT
Start: 2022-02-24

## 2022-02-24 RX ORDER — NALOXONE HYDROCHLORIDE 0.4 MG/ML
0.2 INJECTION, SOLUTION INTRAMUSCULAR; INTRAVENOUS; SUBCUTANEOUS
Status: DISCONTINUED | OUTPATIENT
Start: 2022-02-24 | End: 2022-02-25 | Stop reason: HOSPADM

## 2022-02-24 RX ORDER — CEFAZOLIN SODIUM 2 G/100ML
2 INJECTION, SOLUTION INTRAVENOUS
Status: COMPLETED | OUTPATIENT
Start: 2022-02-24 | End: 2022-02-24

## 2022-02-24 RX ORDER — PROPOFOL 10 MG/ML
INJECTION, EMULSION INTRAVENOUS PRN
Status: DISCONTINUED | OUTPATIENT
Start: 2022-02-24 | End: 2022-02-24

## 2022-02-24 RX ORDER — DIAZEPAM 5 MG
5 TABLET ORAL EVERY 6 HOURS PRN
Qty: 20 TABLET | Refills: 0 | Status: SHIPPED | OUTPATIENT
Start: 2022-02-24

## 2022-02-24 RX ORDER — ONDANSETRON 2 MG/ML
INJECTION INTRAMUSCULAR; INTRAVENOUS PRN
Status: DISCONTINUED | OUTPATIENT
Start: 2022-02-24 | End: 2022-02-24

## 2022-02-24 RX ORDER — ONDANSETRON 2 MG/ML
4 INJECTION INTRAMUSCULAR; INTRAVENOUS EVERY 6 HOURS PRN
Status: DISCONTINUED | OUTPATIENT
Start: 2022-02-24 | End: 2022-02-25 | Stop reason: HOSPADM

## 2022-02-24 RX ORDER — FENTANYL CITRATE 50 UG/ML
INJECTION, SOLUTION INTRAMUSCULAR; INTRAVENOUS PRN
Status: DISCONTINUED | OUTPATIENT
Start: 2022-02-24 | End: 2022-02-24

## 2022-02-24 RX ORDER — BUPIVACAINE HYDROCHLORIDE AND EPINEPHRINE 2.5; 5 MG/ML; UG/ML
INJECTION, SOLUTION INFILTRATION; PERINEURAL PRN
Status: DISCONTINUED | OUTPATIENT
Start: 2022-02-24 | End: 2022-02-24 | Stop reason: HOSPADM

## 2022-02-24 RX ORDER — LIDOCAINE 40 MG/G
CREAM TOPICAL
Status: DISCONTINUED | OUTPATIENT
Start: 2022-02-24 | End: 2022-02-25 | Stop reason: HOSPADM

## 2022-02-24 RX ORDER — PROCHLORPERAZINE MALEATE 10 MG
10 TABLET ORAL EVERY 6 HOURS PRN
Status: DISCONTINUED | OUTPATIENT
Start: 2022-02-24 | End: 2022-02-25 | Stop reason: HOSPADM

## 2022-02-24 RX ORDER — LIDOCAINE HYDROCHLORIDE 20 MG/ML
INJECTION, SOLUTION INFILTRATION; PERINEURAL PRN
Status: DISCONTINUED | OUTPATIENT
Start: 2022-02-24 | End: 2022-02-24

## 2022-02-24 RX ORDER — SODIUM CHLORIDE, SODIUM LACTATE, POTASSIUM CHLORIDE, CALCIUM CHLORIDE 600; 310; 30; 20 MG/100ML; MG/100ML; MG/100ML; MG/100ML
INJECTION, SOLUTION INTRAVENOUS CONTINUOUS PRN
Status: DISCONTINUED | OUTPATIENT
Start: 2022-02-24 | End: 2022-02-24

## 2022-02-24 RX ORDER — FLUMAZENIL 0.1 MG/ML
0.2 INJECTION, SOLUTION INTRAVENOUS
Status: DISCONTINUED | OUTPATIENT
Start: 2022-02-24 | End: 2022-02-25 | Stop reason: HOSPADM

## 2022-02-24 RX ADMIN — LIDOCAINE HYDROCHLORIDE 100 MG: 20 INJECTION, SOLUTION INFILTRATION; PERINEURAL at 11:41

## 2022-02-24 RX ADMIN — PROPOFOL 100 MCG/KG/MIN: 10 INJECTION, EMULSION INTRAVENOUS at 11:11

## 2022-02-24 RX ADMIN — SODIUM CHLORIDE, SODIUM LACTATE, POTASSIUM CHLORIDE, CALCIUM CHLORIDE: 600; 310; 30; 20 INJECTION, SOLUTION INTRAVENOUS at 11:37

## 2022-02-24 RX ADMIN — PROPOFOL 200 MG: 10 INJECTION, EMULSION INTRAVENOUS at 11:41

## 2022-02-24 RX ADMIN — Medication 50 MG: at 11:41

## 2022-02-24 RX ADMIN — FENTANYL CITRATE 50 MCG: 50 INJECTION, SOLUTION INTRAMUSCULAR; INTRAVENOUS at 11:41

## 2022-02-24 RX ADMIN — ONDANSETRON 4 MG: 2 INJECTION INTRAMUSCULAR; INTRAVENOUS at 12:40

## 2022-02-24 RX ADMIN — SODIUM CHLORIDE, SODIUM LACTATE, POTASSIUM CHLORIDE, CALCIUM CHLORIDE: 600; 310; 30; 20 INJECTION, SOLUTION INTRAVENOUS at 12:31

## 2022-02-24 RX ADMIN — CEFAZOLIN SODIUM 2 G: 2 INJECTION, SOLUTION INTRAVENOUS at 11:37

## 2022-02-24 NOTE — ANESTHESIA CARE TRANSFER NOTE
Patient: Cleveland Maciel    Procedure: Procedure(s):  colonoscopy with polypectomy  posterior right hemorrhoidectomy with harmonic scalpel.  COLONOSCOPY, WITH CO2 INSUFFLATION  COLONOSCOPY, WITH HEMORRHAGE CONTROL       Diagnosis: Special screening for malignant neoplasms, colon [Z12.11]  External hemorrhoids [K64.4]  Diagnosis Additional Information: No value filed.    Anesthesia Type:   General     Note:    Oropharynx: oropharynx clear of all foreign objects  Level of Consciousness: awake  Oxygen Supplementation: nasal cannula    Independent Airway: airway patency satisfactory and stable  Dentition: dentition unchanged  Vital Signs Stable: post-procedure vital signs reviewed and stable  Report to RN Given: handoff report given  Patient transferred to: PACU    Handoff Report: Identifed the Patient, Identified the Reponsible Provider, Reviewed the pertinent medical history, Discussed the surgical course, Reviewed Intra-OP anesthesia mangement and issues during anesthesia, Set expectations for post-procedure period and Allowed opportunity for questions and acknowledgement of understanding      Vitals:  Vitals Value Taken Time   /62 02/24/22 1253   Temp     Pulse 79 02/24/22 1255   Resp     SpO2 100 % 02/24/22 1256   Vitals shown include unvalidated device data.    Electronically Signed By: SEBASTIÁN Loredo CRNA  February 24, 2022  12:57 PM

## 2022-02-24 NOTE — OP NOTE
JAYCE KWAN MD                   OPERATIVE REPORT    February 24, 2022  Preoperative diagnosis:  Posterior slightly to the right side  excess skin and hemorrhoids tissue keeping area difficult to keep clean.   Postoperative diagnosis:  same   Procedure:  posterior hemorrhoidectomy  using the harmonic scalpel.   Anesthesia:  General anesthesia with 0.25% marcaine placed thru out the procedure.   Blood loss: zero  Specimen:  sent  Surgeon : Jayce Kwan M.D.  Indications:  Cleveland Maciel is a 51 year old year old male  with a right posterior hemorrhoids that is making it hard to keep the area clean.  And some discomfort.  It was felt that it should be removed.   Risks and complications were explained to the patient including bleeding, risk of anenesthesia, infection, recurrance and of course pain. Questions were answered and postoperative instructions were given to patient and any one with the patient.    We did discuss doing a banding as this may be more comfortable for the patient but he would rather do at same time as his colonoscopy.  But after the procedure feel that it may have taken a lot of banding to help with this excess skin and hemorrhoidal tissue.      Procedure:  The patient was brought to the OR, placed in supine position  after given  general anesthesia,     The patient was placed in the jackknife position, with the buttocks taped for good visualization.   A rectal  exam was done.  There were  no other hemorrhoids that needed surgery at this time other then the posterior one.   A block of the area was done with the local.   The hemorrhoids was grasped with alices and coming from multiple angles the hemorrhoids was taken down along with its excess skin to remove it with the harmonic scalpel.   After that was done  I did place a  3-0 vicryl sutures to approximate the skin to speed up the healing.  More local was placed in the area.  Then the surgicel was placed in the anus after  soaking it in the local.  Then fluffs were soaked and placed over the site also along with an abdpad and stretch panties.       The patient did receive antibiotics preoperatively.       Plan is to discharge the patient home today.   I will see the patient back in approximately 2 weeks.           AMARI MARQUES MD

## 2022-02-24 NOTE — ANESTHESIA PREPROCEDURE EVALUATION
Anesthesia Pre-Procedure Evaluation    Patient: Cleveland Maciel   MRN: [id number] : 1970        Procedure : Procedure(s):  colonoscopy with possible polypectomy possible biopsy and right and posterior right hemorrhoidectomy with harmonic scalpel.  COLONOSCOPY, WITH CO2 INSUFFLATION          Past Medical History:   Diagnosis Date     ADD (attention deficit disorder)       Past Surgical History:   Procedure Laterality Date     KNEE SURGERY Left     ACL     OPTICAL TRACKING SYSTEM ENDOSCOPIC SINUS SURGERY Bilateral 11/15/2021    Procedure: SINUS SURGERY, ENDOSCOPIC, USING OPTICAL TRACKING SYSTEM, Total Ethmoidectomy Bilateral Maxillary Antrostomies, bilateral spheniodotomy, Bilateral frontal sinusotomy;  Surgeon: Lopez Turner MD;  Location: [facility] OR     SEPTOPLASTY, TURBINOPLASTY, COMBINED N/A 11/15/2021    Procedure: septoplasty; bilateral inferior turbinate reduction;  Surgeon: Lopez Turner MD;  Location: [facility] OR     VARICOCELECTOMY        No Known Allergies   Social History     Tobacco Use     Smoking status: Never Smoker     Smokeless tobacco: Current User     Types: Chew   Substance Use Topics     Alcohol use: Yes     Comment: wine daily       Wt Readings from Last 1 Encounters:   22 93 kg (205 lb)        Anesthesia Evaluation   Pt has had prior anesthetic.     No history of anesthetic complications       ROS/MED HX  ENT/Pulmonary: Comment: Chronic pansinusitis  Nasal polyp  Nasal septal deviation  Nasal turbinate hypertrophy  Nasal obstruction          Neurologic:  - neg neurologic ROS     Cardiovascular:  - neg cardiovascular ROS     METS/Exercise Tolerance: >4 METS    Hematologic:  - neg hematologic  ROS     Musculoskeletal:  - neg musculoskeletal ROS     GI/Hepatic: Comment: External hemorrhoids      Renal/Genitourinary:  - neg Renal ROS     Endo:  - neg endo ROS     Psychiatric/Substance Use:     (+) psychiatric history other (comment) (ADD)     Infectious Disease:  - neg  infectious disease ROS     Malignancy:  - neg malignancy ROS     Other:  - neg other ROS          Physical Exam    Airway  airway exam normal      Mallampati: I   TM distance: > 3 FB   Neck ROM: full   Mouth opening: > 3 cm    Respiratory Devices and Support         Dental  no notable dental history         Cardiovascular   cardiovascular exam normal       Rhythm and rate: regular and normal     Pulmonary   pulmonary exam normal        breath sounds clear to auscultation           OUTSIDE LABS:  CBC:   Lab Results   Component Value Date    WBC 7.0 09/10/2021    HGB 15.9 02/15/2022    HGB 15.8 09/10/2021    HCT 46.8 09/10/2021     09/10/2021     BMP:   Lab Results   Component Value Date     09/10/2021    POTASSIUM 4.6 09/10/2021    CHLORIDE 105 09/10/2021    CO2 30 09/10/2021    BUN 11 09/10/2021    CR 1.01 09/10/2021    GLC 91 09/10/2021     COAGS: No results found for: PTT, INR, FIBR  POC: No results found for: BGM, HCG, HCGS  HEPATIC:   Lab Results   Component Value Date    ALBUMIN 4.2 09/10/2021    PROTTOTAL 7.6 09/10/2021    ALT 28 09/10/2021    AST 19 09/10/2021    ALKPHOS 69 09/10/2021    BILITOTAL 1.0 09/10/2021     OTHER:   Lab Results   Component Value Date    PATRICIA 8.9 09/10/2021    TSH 1.28 09/10/2021       Anesthesia Plan    ASA Status:  1   NPO Status:  NPO Appropriate    Anesthesia Type: General.     - Airway: ETT   Induction: Intravenous, Propofol.   Maintenance: Balanced.        Consents    Anesthesia Plan(s) and associated risks, benefits, and realistic alternatives discussed. Questions answered and patient/representative(s) expressed understanding.    - Discussed:     - Discussed with:  Patient    Use of blood products discussed: No .     Postoperative Care    Pain management: Multi-modal analgesia, Oral pain medications.   PONV prophylaxis: Ondansetron (or other 5HT-3), Dexamethasone or Solumedrol     Comments:                Cameron Mitchell DO

## 2022-02-24 NOTE — DISCHARGE INSTRUCTIONS
HEMORRHOIDECTOMY DISCHARGE INSTRUCTIONS  DR. AMARI STOREY    1. You may resume your regular diet when you feel you are ready to.  DO NOT drink alcoholic beverages for 24 hours of while you are taking prescription medication.  2. Limit your activities for the first 48 hours.  Gradually increase them as tolerated.  You may use stairs.  I encourage you to walk as tolerated.  3. You will have some discomfort at the incision sites.  This is expected.  This should improve over the next 2-3 days.  Ice and pain medication will help with this pain.  Use prescribed pain medication as instructed.  4. Bruising and mild swelling is normal after surgery.  The area below and around the incision(s) will be hard and elevated.  This is normal.  I call it the healing ridge.  This will resolve slowly over the next several months.  If you feel the pain is increasing and cannot explain it by increasing activity please call us at [phone].    5. The dressing will often have some blood on it.  You may shower 24 hours after surgery.  Clean gently over incision site.  If clear plastic covering or steri-strip comes off and there is still some bleeding or drainage then cover gauze or band-aid.  If no bleeding there is no reason to cover site.    6. Avoid Aspirin for the first 72 hours after the procedure.  This medication may increase the tendency to bleed.  7. Use the following medications (in addition to your normal meds) as shown:  Name of Medicine  Dose  Frequency  Reason  a. Percocet 5mg 1-2 ever 6 hours as needed for pain.  This contains 500 mg of Tylenol (acetaminophen) per tablet.  For example, you may take 1 Percocet and 1 Tylenol, OR 2 Percocet and no Tylenol, OR 2 Tylenol and no Percocet every 6 hours.  b. Tylenol (acetaminophen) 500 mg every 6 hours as needed for pain.  Do not take more than 1000 mg every 6 hours.  (see above)  c. Motrin (ibuprophen) 200-800 mg every 6 hours as needed for pain.  Take with  food.    8. Notify [facility] at [phone]  if:    Your discomfort is not relieved by your pain medication.    You have signs of infection such as temperature above 100.5 degrees orally, chills, or increasing daily discomfort.    Incision site is becoming more red and/ or there is purulent drainage.    You have questions or concerns.  9. Please call [phone] to schedule a follow up appointment in 3 weeks(s)  10. When taking narcotics (pain medication more than Tylenol (acetaminophen) and Motrin (ibuprophen) it is important to keep your stools soft to avoid constipation and pain with straining.  This is best done by drinking fluids (nonalcoholic and non-caffeinated) and taking a stool softener i.e. Metamucil or milk of magnesia.  You may be able to use non-narcotics for pain relief especially by the 3rd post-operative day.  Tylenol 500 mg every 6 hours and/ or Motrin (ibuprophen) 200-800 mg every 6 hours.  Please do not take more than 4 grams of Tylenol (acetaminophen) per day.  Remember your Percocet does have Tylenol (acetaminophen) already in it.  If you have a history of stomach ulcers or stomach problems than do not take the Motrin (ibuprophen).  Please take Motrin (ibuprophen) with food to help protect the stomach.    11.  Do not drive or operate heavy machinery for 24 hours after surgery or when taking narcotics.  You may resume driving when feel that you can safely avoid an accident and are not taking narcotics.  This is usually 5 to 7 days after surgery.  You should not be alone for 24 hours after surgery.      Use the valium to help with muscle and anal spasms.          [facility]   Adult Discharge Orders & Instructions     For 24 hours after surgery    11. Get plenty of rest.  A responsible adult must stay with you for at least 24 hours after you leave the hospital.   12. Do not drive or use heavy equipment.  If you have weakness or tingling, don't drive or use heavy  equipment until this feeling goes away.  13. Do not drink alcohol.  14. Avoid strenuous or risky activities.  Ask for help when climbing stairs.   15. You may feel lightheaded.  IF so, sit for a few minutes before standing.  Have someone help you get up.   16. If you have nausea (feel sick to your stomach): Drink only clear liquids such as apple juice, ginger ale, broth or 7-Up.  Rest may also help.  Be sure to drink enough fluids.  Move to a regular diet as you feel able.  17. You may have a slight fever. Call the doctor if your fever is over 100 F (37.7 C) (taken under the tongue) or lasts longer than 24 hours.  18. You may have a dry mouth, a sore throat, muscle aches or trouble sleeping.  These should go away after 24 hours.  19. Do not make important or legal decisions.     Call your doctor for any of the followin.  Signs of infection (fever, growing tenderness at the surgery site, a large amount of drainage or bleeding, severe pain, foul-smelling drainage, redness, swelling).    2. It has been over 8 to 10 hours since surgery and you are still not able to urinate (pass water).    3.  Headache for over 24 hours.                  4. Signs of Covid-19 infection (temperature over 100 degrees, shortness of breath, cough, loss of taste/smell, generalized body aches, persistent headache,                  chills, sore throat, nausea/vomiting/diarrhea).

## 2022-02-24 NOTE — ANESTHESIA POSTPROCEDURE EVALUATION
Patient: Cleveland Maciel    Procedure: Procedure(s):  colonoscopy with polypectomy  posterior right hemorrhoidectomy with harmonic scalpel.  COLONOSCOPY, WITH CO2 INSUFFLATION  COLONOSCOPY, WITH HEMORRHAGE CONTROL       Anesthesia Type:  General    Note:  Disposition: Outpatient   Postop Pain Control: Uneventful            Sign Out: Well controlled pain   PONV: No   Neuro/Psych: Uneventful            Sign Out: Acceptable/Baseline neuro status   Airway/Respiratory: Uneventful            Sign Out: AIRWAY IN SITU/Resp. Support   CV/Hemodynamics: Uneventful            Sign Out: Acceptable CV status; No obvious hypovolemia; No obvious fluid overload   Other NRE: NONE   DID A NON-ROUTINE EVENT OCCUR? No           Last vitals:  Vitals Value Taken Time   /62 02/24/22 1300   Temp 96.7  F (35.9  C) 02/24/22 1251   Pulse 74 02/24/22 1304   Resp 20 02/24/22 1305   SpO2 99 % 02/24/22 1305   Vitals shown include unvalidated device data.    Electronically Signed By: Cameron Mitchell DO  February 24, 2022  1:52 PM

## 2022-02-28 LAB
PATH REPORT.COMMENTS IMP SPEC: NORMAL
PATH REPORT.COMMENTS IMP SPEC: NORMAL
PATH REPORT.FINAL DX SPEC: NORMAL
PATH REPORT.GROSS SPEC: NORMAL
PATH REPORT.MICROSCOPIC SPEC OTHER STN: NORMAL
PATH REPORT.RELEVANT HX SPEC: NORMAL
PHOTO IMAGE: NORMAL

## 2022-03-08 ENCOUNTER — OFFICE VISIT (OUTPATIENT)
Dept: SURGERY | Facility: CLINIC | Age: 52
End: 2022-03-08
Payer: COMMERCIAL

## 2022-03-08 VITALS
SYSTOLIC BLOOD PRESSURE: 137 MMHG | BODY MASS INDEX: 29.84 KG/M2 | HEART RATE: 68 BPM | DIASTOLIC BLOOD PRESSURE: 83 MMHG | WEIGHT: 205 LBS

## 2022-03-08 DIAGNOSIS — Z87.19 S/P HEMORRHOIDECTOMY: Primary | ICD-10-CM

## 2022-03-08 DIAGNOSIS — Z98.890 S/P HEMORRHOIDECTOMY: Primary | ICD-10-CM

## 2022-03-08 PROCEDURE — 99024 POSTOP FOLLOW-UP VISIT: CPT | Performed by: SURGERY

## 2022-03-08 NOTE — PROGRESS NOTES
Cleveland is a 51 year old male who is status post hemorrhoidectomy  with no chills and no fever.      Patient's  Pain is still there with bowel movement.   Is taking a stool softner.  .  Appetite is  improving.    Wound(s)  Is/are  healing with no evidence of infection.      Path report shows:    Final Diagnosis   A. CECAL POLYP, POLYPECTOMY:  -Colonic mucosa with lymphoid aggregates, negative for dysplasia    B. COLON POLYP @45 CM, POLYPECTOMY:  -Colonic mucosa with lymphoid aggregates, negative for dysplasia    C. RIGHT POSTERIOR HEMORRHOID, HEMORRHOIDECTOMY:  -Benign hemorrhoids, negative for dysplasia and malignancy         Plan:   Your colons job is to absorb the liquid in your stool.  As we get older the colon or other medications can slow down the colon and allow the stool to get to firm.  Fiber mixes with your stool and does not allow all the water to get absorbed by the colon.  This will not give you diarrhea in fact I use it for people with diarrhea since it causes the stool to bulk up more and is easier to control.    Below are several fiber options.    For your constipation try using Metamucil or it's generic equivalent 1-2 tablespoons with a large glass of water or juice every day.      You can also use flax seed that is easily obtained at your grocery store. Make sure it is ground up and sprinkle 2 tablespoons on your cereal each morning and drink a large glass of water with it.  You can also mix in yogurt, and even bake in bread or muffins.    Flax seed seems to give less gas and does not have any taste.   If these are not working for you I would be glad to see you back in my clinic to discuss other options.  Call [phone]: to set up an appointment.    .  please follow up by having a repeat colonoscopy in 10 years.  Use antibiotic ointment on wound at night.     Sincerely     Jayce Kwan M.D    Time spent with the patient with greater that 50% of the time in discussion was 15 minutes.  Jayce  Aniya ASENCIO

## 2022-03-08 NOTE — PATIENT INSTRUCTIONS
Cleveland is a 51 year old male who is status post hemorrhoidectomy  with no chills and no fever.      Patient's  Pain is still there with bowel movement.   Is taking a stool softner.  .  Appetite is  improving.    Wound(s)  Is/are  healing with no evidence of infection.      Path report shows:    Final Diagnosis   A. CECAL POLYP, POLYPECTOMY:  -Colonic mucosa with lymphoid aggregates, negative for dysplasia    B. COLON POLYP @45 CM, POLYPECTOMY:  -Colonic mucosa with lymphoid aggregates, negative for dysplasia    C. RIGHT POSTERIOR HEMORRHOID, HEMORRHOIDECTOMY:  -Benign hemorrhoids, negative for dysplasia and malignancy         Plan:   Your colons job is to absorb the liquid in your stool.  As we get older the colon or other medications can slow down the colon and allow the stool to get to firm.  Fiber mixes with your stool and does not allow all the water to get absorbed by the colon.  This will not give you diarrhea in fact I use it for people with diarrhea since it causes the stool to bulk up more and is easier to control.    Below are several fiber options.    For your constipation try using Metamucil or it's generic equivalent 1-2 tablespoons with a large glass of water or juice every day.      You can also use flax seed that is easily obtained at your grocery store. Make sure it is ground up and sprinkle 2 tablespoons on your cereal each morning and drink a large glass of water with it.  You can also mix in yogurt, and even bake in bread or muffins.    Flax seed seems to give less gas and does not have any taste.   If these are not working for you I would be glad to see you back in my clinic to discuss other options.  Call [phone]: to set up an appointment.    .  please follow up by having a repeat colonoscopy in 10 years.  Use antibiotic ointment on wound at night.     Sincerely     Jayce Kwan M.D

## 2022-03-08 NOTE — LETTER
3/8/2022         RE: Cleveland Maciel  [address] [facility] [address]        Dear Colleague,    Thank you for referring your patient, Cleveland Maciel, to the [facility]. Please see a copy of my visit note below.    Cleveland is a 51 year old male who is status post hemorrhoidectomy  with no chills and no fever.      Patient's  Pain is still there with bowel movement.   Is taking a stool softner.  .  Appetite is  improving.    Wound(s)  Is/are  healing with no evidence of infection.      Path report shows:    Final Diagnosis   A. CECAL POLYP, POLYPECTOMY:  -Colonic mucosa with lymphoid aggregates, negative for dysplasia    B. COLON POLYP @45 CM, POLYPECTOMY:  -Colonic mucosa with lymphoid aggregates, negative for dysplasia    C. RIGHT POSTERIOR HEMORRHOID, HEMORRHOIDECTOMY:  -Benign hemorrhoids, negative for dysplasia and malignancy         Plan:   Your colons job is to absorb the liquid in your stool.  As we get older the colon or other medications can slow down the colon and allow the stool to get to firm.  Fiber mixes with your stool and does not allow all the water to get absorbed by the colon.  This will not give you diarrhea in fact I use it for people with diarrhea since it causes the stool to bulk up more and is easier to control.    Below are several fiber options.    For your constipation try using Metamucil or it's generic equivalent 1-2 tablespoons with a large glass of water or juice every day.      You can also use flax seed that is easily obtained at your grocery store. Make sure it is ground up and sprinkle 2 tablespoons on your cereal each morning and drink a large glass of water with it.  You can also mix in yogurt, and even bake in bread or muffins.    Flax seed seems to give less gas and does not have any taste.   If these are not working for you I would be glad to see you back in my clinic to discuss other options.  Call [phone]: to set up an  appointment.    .  please follow up by having a repeat colonoscopy in 10 years.  Use antibiotic ointment on wound at night.     Sincerely     Jayce Kwan M.D    Time spent with the patient with greater that 50% of the time in discussion was 15 minutes.  Jayce Kwan MD                      Again, thank you for allowing me to participate in the care of your patient.        Sincerely,        Jayce Kwan MD

## 2022-09-03 ENCOUNTER — HEALTH MAINTENANCE LETTER (OUTPATIENT)
Age: 52
End: 2022-09-03

## 2023-01-15 ENCOUNTER — HEALTH MAINTENANCE LETTER (OUTPATIENT)
Age: 53
End: 2023-01-15

## 2024-02-17 ENCOUNTER — HEALTH MAINTENANCE LETTER (OUTPATIENT)
Age: 54
End: 2024-02-17

## 2025-03-08 ENCOUNTER — HEALTH MAINTENANCE LETTER (OUTPATIENT)
Age: 55
End: 2025-03-08

## (undated) DEVICE — ENDO SHEATH STORZ SHARPSITE ENDOSCRUB 0DEG 4MM 1912000

## (undated) DEVICE — DRSG ABDOMINAL 07 1/2X8" 7197D

## (undated) DEVICE — BLADE SINUS RAD12 CVD 4MM FUSION ROTATE W/TRACKING

## (undated) DEVICE — DRAPE SPLIT EENT 76X124" 3X28" 9447

## (undated) DEVICE — DRAPE LAP W/ARMBOARD 29410

## (undated) DEVICE — NDL SPINAL 25GA 3.5" QUINCKE 405180

## (undated) DEVICE — SYR EAR BULB 3OZ 0035830

## (undated) DEVICE — MASK CONE SHAPED 00152

## (undated) DEVICE — SOL NACL 0.9% INJ 1000ML BAG 07983-09

## (undated) DEVICE — SU CHROMIC 4-0 FS-2 27" 635H

## (undated) DEVICE — PANTIES MESH LG/XLG 2PK 706M2

## (undated) DEVICE — NDL 19GA 1.5"

## (undated) DEVICE — PACK MINOR SBA15MIFSE

## (undated) DEVICE — DRSG NASOPORE FRAG FIRM 8CM 5400-020-008

## (undated) DEVICE — ENDO SHEATH ENDOSCRUB 45DEG 4MM 1912015

## (undated) DEVICE — SUCTION CANISTER MEDIVAC LINER 1500ML W/LID 65651-515

## (undated) DEVICE — SU CHROMIC 5-0 P-3 18" 687G

## (undated) DEVICE — SOL WATER IRRIG 1000ML BOTTLE 07139-09

## (undated) DEVICE — PREP SKIN SCRUB TRAY 4461A

## (undated) DEVICE — SYR 50ML CATH TIP W/O NDL 309620

## (undated) DEVICE — GLOVE PROTEXIS W/NEU-THERA 7.5  2D73TE75

## (undated) DEVICE — ESU HARMONIC SCALPEL SHEARS 36CMX5MM ACE36E

## (undated) DEVICE — PAD PERI W/WINGS 1580A

## (undated) DEVICE — SUCTION TIP YANKAUER W/O VENT K86

## (undated) DEVICE — SPONGE COTTONOID 1/2X3" 80-1407

## (undated) DEVICE — SU CHROMIC 4-0 P-3 18" 1654G

## (undated) DEVICE — SU ETHILON 3-0 FS-1 18" 669H

## (undated) DEVICE — TRACKER ENT OTS INSTRUMENT FUSION 9733533

## (undated) DEVICE — TUBING SUCTION 10'X3/16" N510

## (undated) DEVICE — SPECIMEN CONTAINER 4OZ

## (undated) DEVICE — NDL 22GA 1.5"

## (undated) DEVICE — ESU GROUND PAD ADULT W/CORD E7507

## (undated) DEVICE — DRSG KERLIX FLUFFS X5

## (undated) DEVICE — TRACKER ENT OTS PATIENT FUSION 9733534

## (undated) DEVICE — SYR 10ML FINGER CONTROL W/O NDL 309695

## (undated) DEVICE — Device

## (undated) DEVICE — BLADE INFERIOR TURBINATE 2.9MMX11CM 1882940HR

## (undated) DEVICE — DECANTER TRANSFER DEVICE 2008S

## (undated) DEVICE — PREP POVIDONE IODINE SWABS X3

## (undated) RX ORDER — PHENYLEPHRINE HYDROCHLORIDE 10 MG/ML
INJECTION INTRAVENOUS
Status: DISPENSED
Start: 2021-11-15

## (undated) RX ORDER — FENTANYL CITRATE 50 UG/ML
INJECTION, SOLUTION INTRAMUSCULAR; INTRAVENOUS
Status: DISPENSED
Start: 2022-02-24

## (undated) RX ORDER — PROPOFOL 10 MG/ML
INJECTION, EMULSION INTRAVENOUS
Status: DISPENSED
Start: 2021-11-15

## (undated) RX ORDER — OXYCODONE HYDROCHLORIDE 5 MG/1
TABLET ORAL
Status: DISPENSED
Start: 2021-11-15

## (undated) RX ORDER — DEXAMETHASONE SODIUM PHOSPHATE 4 MG/ML
INJECTION, SOLUTION INTRA-ARTICULAR; INTRALESIONAL; INTRAMUSCULAR; INTRAVENOUS; SOFT TISSUE
Status: DISPENSED
Start: 2021-11-15

## (undated) RX ORDER — CEFAZOLIN SODIUM 1 G/3ML
INJECTION, POWDER, FOR SOLUTION INTRAMUSCULAR; INTRAVENOUS
Status: DISPENSED
Start: 2022-02-24

## (undated) RX ORDER — LIDOCAINE HYDROCHLORIDE AND EPINEPHRINE 10; 10 MG/ML; UG/ML
INJECTION, SOLUTION INFILTRATION; PERINEURAL
Status: DISPENSED
Start: 2021-11-15

## (undated) RX ORDER — ACETAMINOPHEN 325 MG/1
TABLET ORAL
Status: DISPENSED
Start: 2021-11-15

## (undated) RX ORDER — ONDANSETRON 2 MG/ML
INJECTION INTRAMUSCULAR; INTRAVENOUS
Status: DISPENSED
Start: 2022-02-24

## (undated) RX ORDER — FENTANYL CITRATE-0.9 % NACL/PF 10 MCG/ML
PLASTIC BAG, INJECTION (ML) INTRAVENOUS
Status: DISPENSED
Start: 2021-11-15

## (undated) RX ORDER — CIPROFLOXACIN AND DEXAMETHASONE 3; 1 MG/ML; MG/ML
SUSPENSION/ DROPS AURICULAR (OTIC)
Status: DISPENSED
Start: 2021-11-15

## (undated) RX ORDER — ONDANSETRON 2 MG/ML
INJECTION INTRAMUSCULAR; INTRAVENOUS
Status: DISPENSED
Start: 2021-11-15

## (undated) RX ORDER — LIDOCAINE HYDROCHLORIDE 40 MG/ML
SOLUTION TOPICAL
Status: DISPENSED
Start: 2021-11-15

## (undated) RX ORDER — COCAINE HYDROCHLORIDE 40 MG/ML
SOLUTION NASAL
Status: DISPENSED
Start: 2021-11-15

## (undated) RX ORDER — FENTANYL CITRATE 50 UG/ML
INJECTION, SOLUTION INTRAMUSCULAR; INTRAVENOUS
Status: DISPENSED
Start: 2021-11-15

## (undated) RX ORDER — OXYMETAZOLINE HYDROCHLORIDE 0.05 G/100ML
SPRAY NASAL
Status: DISPENSED
Start: 2021-11-15